# Patient Record
Sex: FEMALE | Race: WHITE | NOT HISPANIC OR LATINO | Employment: FULL TIME | URBAN - METROPOLITAN AREA
[De-identification: names, ages, dates, MRNs, and addresses within clinical notes are randomized per-mention and may not be internally consistent; named-entity substitution may affect disease eponyms.]

---

## 2017-01-12 ENCOUNTER — HOSPITAL ENCOUNTER (OUTPATIENT)
Dept: MAMMOGRAPHY | Facility: HOSPITAL | Age: 57
Discharge: HOME | End: 2017-01-12
Admitting: NURSE PRACTITIONER

## 2017-01-12 DIAGNOSIS — Z12.31 VISIT FOR SCREENING MAMMOGRAM: ICD-10-CM

## 2017-01-12 PROCEDURE — 77067 SCR MAMMO BI INCL CAD: CPT | Performed by: RADIOLOGY

## 2017-01-12 PROCEDURE — G0202 SCR MAMMO BI INCL CAD: HCPCS

## 2017-01-12 PROCEDURE — 77063 BREAST TOMOSYNTHESIS BI: CPT

## 2017-01-12 PROCEDURE — 77063 BREAST TOMOSYNTHESIS BI: CPT | Performed by: RADIOLOGY

## 2017-07-02 ENCOUNTER — HOSPITAL ENCOUNTER (OUTPATIENT)
Facility: HOSPITAL | Age: 57
Setting detail: OBSERVATION
Discharge: HOME | End: 2017-07-03
Attending: EMERGENCY MEDICINE | Admitting: HOSPITALIST

## 2017-07-02 ENCOUNTER — APPOINTMENT (OUTPATIENT)
Dept: CT IMAGING | Facility: HOSPITAL | Age: 57
End: 2017-07-02

## 2017-07-02 DIAGNOSIS — E87.6 HYPOKALEMIA, GASTROINTESTINAL LOSSES: ICD-10-CM

## 2017-07-02 DIAGNOSIS — R19.7 VOMITING AND DIARRHEA: ICD-10-CM

## 2017-07-02 DIAGNOSIS — R11.10 VOMITING AND DIARRHEA: ICD-10-CM

## 2017-07-02 DIAGNOSIS — R10.84 GENERALIZED ABDOMINAL PAIN: Primary | ICD-10-CM

## 2017-07-02 DIAGNOSIS — R11.2 INTRACTABLE VOMITING WITH NAUSEA, UNSPECIFIED VOMITING TYPE: ICD-10-CM

## 2017-07-02 PROBLEM — R94.31 PROLONGED Q-T INTERVAL ON ECG: Status: ACTIVE | Noted: 2017-07-02

## 2017-07-02 LAB
ALBUMIN SERPL-MCNC: 4.7 G/DL (ref 3.2–4.8)
ALBUMIN/GLOB SERPL: 1.7 G/DL (ref 1.5–2.5)
ALP SERPL-CCNC: 60 U/L (ref 25–100)
ALT SERPL W P-5'-P-CCNC: 28 U/L (ref 7–40)
ANION GAP SERPL CALCULATED.3IONS-SCNC: 12 MMOL/L (ref 3–11)
AST SERPL-CCNC: 28 U/L (ref 0–33)
BACTERIA UR QL AUTO: ABNORMAL /HPF
BASOPHILS # BLD AUTO: 0.01 10*3/MM3 (ref 0–0.2)
BASOPHILS NFR BLD AUTO: 0.1 % (ref 0–1)
BILIRUB SERPL-MCNC: 0.9 MG/DL (ref 0.3–1.2)
BILIRUB UR QL STRIP: NEGATIVE
BNP SERPL-MCNC: 11 PG/ML (ref 0–100)
BUN BLD-MCNC: 17 MG/DL (ref 9–23)
BUN/CREAT SERPL: 21.3 (ref 7–25)
CALCIUM SPEC-SCNC: 10.1 MG/DL (ref 8.7–10.4)
CHLORIDE SERPL-SCNC: 104 MMOL/L (ref 99–109)
CK MB SERPL-CCNC: 0.53 NG/ML (ref 0–5)
CK SERPL-CCNC: 58 U/L (ref 26–174)
CLARITY UR: ABNORMAL
CO2 SERPL-SCNC: 22 MMOL/L (ref 20–31)
COLOR UR: YELLOW
CREAT BLD-MCNC: 0.8 MG/DL (ref 0.6–1.3)
DEPRECATED RDW RBC AUTO: 43.2 FL (ref 37–54)
EOSINOPHIL # BLD AUTO: 0.03 10*3/MM3 (ref 0–0.3)
EOSINOPHIL NFR BLD AUTO: 0.3 % (ref 0–3)
ERYTHROCYTE [DISTWIDTH] IN BLOOD BY AUTOMATED COUNT: 13.3 % (ref 11.3–14.5)
GFR SERPL CREATININE-BSD FRML MDRD: 74 ML/MIN/1.73
GLOBULIN UR ELPH-MCNC: 2.8 GM/DL
GLUCOSE BLD-MCNC: 143 MG/DL (ref 70–100)
GLUCOSE UR STRIP-MCNC: NEGATIVE MG/DL
HCT VFR BLD AUTO: 44.5 % (ref 34.5–44)
HGB BLD-MCNC: 15.1 G/DL (ref 11.5–15.5)
HGB UR QL STRIP.AUTO: NEGATIVE
HOLD SPECIMEN: NORMAL
HOLD SPECIMEN: NORMAL
HYALINE CASTS UR QL AUTO: ABNORMAL /LPF
IMM GRANULOCYTES # BLD: 0.03 10*3/MM3 (ref 0–0.03)
IMM GRANULOCYTES NFR BLD: 0.3 % (ref 0–0.6)
KETONES UR QL STRIP: ABNORMAL
LEUKOCYTE ESTERASE UR QL STRIP.AUTO: NEGATIVE
LIPASE SERPL-CCNC: 30 U/L (ref 6–51)
LYMPHOCYTES # BLD AUTO: 0.62 10*3/MM3 (ref 0.6–4.8)
LYMPHOCYTES NFR BLD AUTO: 5.4 % (ref 24–44)
MCH RBC QN AUTO: 30.4 PG (ref 27–31)
MCHC RBC AUTO-ENTMCNC: 33.9 G/DL (ref 32–36)
MCV RBC AUTO: 89.5 FL (ref 80–99)
MONOCYTES # BLD AUTO: 0.4 10*3/MM3 (ref 0–1)
MONOCYTES NFR BLD AUTO: 3.5 % (ref 0–12)
NEUTROPHILS # BLD AUTO: 10.49 10*3/MM3 (ref 1.5–8.3)
NEUTROPHILS NFR BLD AUTO: 90.4 % (ref 41–71)
NITRITE UR QL STRIP: NEGATIVE
PH UR STRIP.AUTO: 8.5 [PH] (ref 5–8)
PLATELET # BLD AUTO: 239 10*3/MM3 (ref 150–450)
PMV BLD AUTO: 10.7 FL (ref 6–12)
POTASSIUM BLD-SCNC: 3.8 MMOL/L (ref 3.5–5.5)
PROT SERPL-MCNC: 7.5 G/DL (ref 5.7–8.2)
PROT UR QL STRIP: ABNORMAL
RBC # BLD AUTO: 4.97 10*6/MM3 (ref 3.89–5.14)
RBC # UR: ABNORMAL /HPF
REF LAB TEST METHOD: ABNORMAL
SODIUM BLD-SCNC: 138 MMOL/L (ref 132–146)
SP GR UR STRIP: 1.01 (ref 1–1.03)
SQUAMOUS #/AREA URNS HPF: ABNORMAL /HPF
TROPONIN I SERPL-MCNC: <0.006 NG/ML
UROBILINOGEN UR QL STRIP: ABNORMAL
WBC NRBC COR # BLD: 11.58 10*3/MM3 (ref 3.5–10.8)
WBC UR QL AUTO: ABNORMAL /HPF
WHOLE BLOOD HOLD SPECIMEN: NORMAL
WHOLE BLOOD HOLD SPECIMEN: NORMAL

## 2017-07-02 PROCEDURE — 96361 HYDRATE IV INFUSION ADD-ON: CPT

## 2017-07-02 PROCEDURE — 85025 COMPLETE CBC W/AUTO DIFF WBC: CPT | Performed by: EMERGENCY MEDICINE

## 2017-07-02 PROCEDURE — 25010000002 ONDANSETRON PER 1 MG: Performed by: EMERGENCY MEDICINE

## 2017-07-02 PROCEDURE — 74177 CT ABD & PELVIS W/CONTRAST: CPT

## 2017-07-02 PROCEDURE — 25010000002 METOCLOPRAMIDE PER 10 MG: Performed by: EMERGENCY MEDICINE

## 2017-07-02 PROCEDURE — 25010000002 ONDANSETRON PER 1 MG

## 2017-07-02 PROCEDURE — 25010000002 DIPHENHYDRAMINE PER 50 MG: Performed by: EMERGENCY MEDICINE

## 2017-07-02 PROCEDURE — 81001 URINALYSIS AUTO W/SCOPE: CPT | Performed by: EMERGENCY MEDICINE

## 2017-07-02 PROCEDURE — 84484 ASSAY OF TROPONIN QUANT: CPT | Performed by: HOSPITALIST

## 2017-07-02 PROCEDURE — 96376 TX/PRO/DX INJ SAME DRUG ADON: CPT

## 2017-07-02 PROCEDURE — 87186 SC STD MICRODIL/AGAR DIL: CPT | Performed by: EMERGENCY MEDICINE

## 2017-07-02 PROCEDURE — 83880 ASSAY OF NATRIURETIC PEPTIDE: CPT | Performed by: HOSPITALIST

## 2017-07-02 PROCEDURE — 87086 URINE CULTURE/COLONY COUNT: CPT | Performed by: EMERGENCY MEDICINE

## 2017-07-02 PROCEDURE — 80053 COMPREHEN METABOLIC PANEL: CPT | Performed by: EMERGENCY MEDICINE

## 2017-07-02 PROCEDURE — 82553 CREATINE MB FRACTION: CPT | Performed by: HOSPITALIST

## 2017-07-02 PROCEDURE — 25010000002 HYDROMORPHONE PER 4 MG: Performed by: EMERGENCY MEDICINE

## 2017-07-02 PROCEDURE — 82550 ASSAY OF CK (CPK): CPT | Performed by: HOSPITALIST

## 2017-07-02 PROCEDURE — 93005 ELECTROCARDIOGRAM TRACING: CPT | Performed by: HOSPITALIST

## 2017-07-02 PROCEDURE — 25010000002 PROMETHAZINE PER 50 MG: Performed by: EMERGENCY MEDICINE

## 2017-07-02 PROCEDURE — 99220 PR INITIAL OBSERVATION CARE/DAY 70 MINUTES: CPT | Performed by: HOSPITALIST

## 2017-07-02 PROCEDURE — 0 IOPAMIDOL 61 % SOLUTION: Performed by: EMERGENCY MEDICINE

## 2017-07-02 PROCEDURE — 87077 CULTURE AEROBIC IDENTIFY: CPT | Performed by: EMERGENCY MEDICINE

## 2017-07-02 PROCEDURE — 96375 TX/PRO/DX INJ NEW DRUG ADDON: CPT

## 2017-07-02 PROCEDURE — 96374 THER/PROPH/DIAG INJ IV PUSH: CPT

## 2017-07-02 PROCEDURE — 99285 EMERGENCY DEPT VISIT HI MDM: CPT

## 2017-07-02 PROCEDURE — 93010 ELECTROCARDIOGRAM REPORT: CPT | Performed by: INTERNAL MEDICINE

## 2017-07-02 PROCEDURE — 83690 ASSAY OF LIPASE: CPT | Performed by: EMERGENCY MEDICINE

## 2017-07-02 RX ORDER — METOCLOPRAMIDE 10 MG/1
10 TABLET ORAL ONCE
Status: DISCONTINUED | OUTPATIENT
Start: 2017-07-02 | End: 2017-07-02

## 2017-07-02 RX ORDER — HYDROMORPHONE HYDROCHLORIDE 1 MG/ML
0.5 INJECTION, SOLUTION INTRAMUSCULAR; INTRAVENOUS; SUBCUTANEOUS
Status: COMPLETED | OUTPATIENT
Start: 2017-07-02 | End: 2017-07-02

## 2017-07-02 RX ORDER — PROMETHAZINE HYDROCHLORIDE 25 MG/ML
12.5 INJECTION, SOLUTION INTRAMUSCULAR; INTRAVENOUS ONCE
Status: COMPLETED | OUTPATIENT
Start: 2017-07-02 | End: 2017-07-02

## 2017-07-02 RX ORDER — ACETAMINOPHEN 500 MG
500 TABLET ORAL EVERY 6 HOURS PRN
Status: DISCONTINUED | OUTPATIENT
Start: 2017-07-02 | End: 2017-07-03 | Stop reason: HOSPADM

## 2017-07-02 RX ORDER — SODIUM CHLORIDE 0.9 % (FLUSH) 0.9 %
10 SYRINGE (ML) INJECTION AS NEEDED
Status: DISCONTINUED | OUTPATIENT
Start: 2017-07-02 | End: 2017-07-03 | Stop reason: HOSPADM

## 2017-07-02 RX ORDER — ONDANSETRON 2 MG/ML
4 INJECTION INTRAMUSCULAR; INTRAVENOUS ONCE
Status: COMPLETED | OUTPATIENT
Start: 2017-07-02 | End: 2017-07-02

## 2017-07-02 RX ORDER — BUPROPION HYDROCHLORIDE 300 MG/1
300 TABLET ORAL DAILY
Status: ON HOLD | COMMUNITY
End: 2017-07-02

## 2017-07-02 RX ORDER — DEXTROSE AND SODIUM CHLORIDE 5; .45 G/100ML; G/100ML
125 INJECTION, SOLUTION INTRAVENOUS CONTINUOUS
Status: DISCONTINUED | OUTPATIENT
Start: 2017-07-02 | End: 2017-07-03 | Stop reason: HOSPADM

## 2017-07-02 RX ORDER — METOCLOPRAMIDE 10 MG/1
10 TABLET ORAL
Qty: 6 TABLET | Refills: 0 | Status: SHIPPED | OUTPATIENT
Start: 2017-07-02 | End: 2017-07-02

## 2017-07-02 RX ORDER — DIPHENHYDRAMINE HYDROCHLORIDE 50 MG/ML
25 INJECTION INTRAMUSCULAR; INTRAVENOUS ONCE
Status: COMPLETED | OUTPATIENT
Start: 2017-07-02 | End: 2017-07-02

## 2017-07-02 RX ORDER — ESTRADIOL 0.07 MG/D
1 PATCH TRANSDERMAL 2 TIMES WEEKLY
COMMUNITY

## 2017-07-02 RX ORDER — ESTRADIOL 0.1 MG/G
2 CREAM VAGINAL 2 TIMES WEEKLY
COMMUNITY

## 2017-07-02 RX ORDER — ONDANSETRON 4 MG/1
4 TABLET, FILM COATED ORAL EVERY 6 HOURS PRN
Qty: 8 TABLET | Refills: 0 | Status: SHIPPED | OUTPATIENT
Start: 2017-07-02 | End: 2017-07-02

## 2017-07-02 RX ORDER — METOCLOPRAMIDE HYDROCHLORIDE 5 MG/ML
10 INJECTION INTRAMUSCULAR; INTRAVENOUS ONCE
Status: COMPLETED | OUTPATIENT
Start: 2017-07-02 | End: 2017-07-02

## 2017-07-02 RX ADMIN — PROMETHAZINE HYDROCHLORIDE 12.5 MG: 25 INJECTION INTRAMUSCULAR; INTRAVENOUS at 07:11

## 2017-07-02 RX ADMIN — HYDROMORPHONE HYDROCHLORIDE 0.5 MG: 1 INJECTION, SOLUTION INTRAMUSCULAR; INTRAVENOUS; SUBCUTANEOUS at 06:31

## 2017-07-02 RX ADMIN — IOPAMIDOL 85 ML: 612 INJECTION, SOLUTION INTRAVENOUS at 07:40

## 2017-07-02 RX ADMIN — SODIUM CHLORIDE 1000 ML: 9 INJECTION, SOLUTION INTRAVENOUS at 09:00

## 2017-07-02 RX ADMIN — SODIUM CHLORIDE 1000 ML: 9 INJECTION, SOLUTION INTRAVENOUS at 06:04

## 2017-07-02 RX ADMIN — DEXTROSE AND SODIUM CHLORIDE 125 ML/HR: 5; 450 INJECTION, SOLUTION INTRAVENOUS at 12:36

## 2017-07-02 RX ADMIN — PROMETHAZINE HYDROCHLORIDE 12.5 MG: 25 INJECTION INTRAMUSCULAR; INTRAVENOUS at 06:04

## 2017-07-02 RX ADMIN — DIPHENHYDRAMINE HYDROCHLORIDE 25 MG: 50 INJECTION INTRAMUSCULAR; INTRAVENOUS at 09:05

## 2017-07-02 RX ADMIN — HYDROMORPHONE HYDROCHLORIDE 0.5 MG: 1 INJECTION, SOLUTION INTRAMUSCULAR; INTRAVENOUS; SUBCUTANEOUS at 06:06

## 2017-07-02 RX ADMIN — ONDANSETRON 4 MG: 2 INJECTION INTRAMUSCULAR; INTRAVENOUS at 05:33

## 2017-07-02 RX ADMIN — METOCLOPRAMIDE 10 MG: 5 INJECTION, SOLUTION INTRAMUSCULAR; INTRAVENOUS at 09:16

## 2017-07-02 RX ADMIN — DEXTROSE AND SODIUM CHLORIDE 125 ML/HR: 5; 450 INJECTION, SOLUTION INTRAVENOUS at 21:15

## 2017-07-02 RX ADMIN — ONDANSETRON 4 MG: 2 INJECTION INTRAMUSCULAR; INTRAVENOUS at 06:31

## 2017-07-02 NOTE — ED PROVIDER NOTES
Subjective   HPI Comments: Patient states that she began feeling mildly nauseous yesterday afternoon with a decreased appetite.  Last night around approximately 11:00 she began to experience diarrhea and vomiting which have persisted throughout the night with associated abdominal pain.  She believes she stopped approximately 8 times overnight.  She denies similar previous episodes.    Patient is a 57 y.o. female presenting with abdominal pain.   History provided by:  Patient  Abdominal Pain   Pain location:  Epigastric  Pain quality: cramping    Pain radiates to:  Does not radiate  Onset quality:  Gradual  Duration:  1 day  Timing:  Constant  Progression:  Worsening  Chronicity:  New  Context: eating, retching and suspicious food intake    Context: not alcohol use, not awakening from sleep, not diet changes, not laxative use, not medication withdrawal, not previous surgeries, not recent illness, not recent travel and not trauma    Relieved by:  Nothing  Worsened by:  Nothing  Ineffective treatments:  None tried  Associated symptoms: anorexia, diarrhea, fatigue, fever, flatus, hematemesis, hematochezia, hematuria, nausea and vomiting    Associated symptoms: no belching, no chest pain, no chills, no constipation, no cough, no dysuria, no melena, no shortness of breath, no sore throat, no vaginal bleeding and no vaginal discharge    Risk factors: no alcohol abuse, no aspirin use, not elderly, has not had multiple surgeries, no NSAID use, not obese, not pregnant and no recent hospitalization        Review of Systems   Constitutional: Positive for fatigue and fever. Negative for chills.   HENT: Negative for sore throat.    Respiratory: Negative for cough and shortness of breath.    Cardiovascular: Negative for chest pain.   Gastrointestinal: Positive for abdominal pain, anorexia, diarrhea, flatus, hematemesis, hematochezia, nausea and vomiting. Negative for constipation and melena.   Genitourinary: Positive for  hematuria. Negative for dysuria, vaginal bleeding and vaginal discharge.   All other systems reviewed and are negative.      History reviewed. No pertinent past medical history.    No Known Allergies    Past Surgical History:   Procedure Laterality Date   •  SECTION     • CHOLECYSTECTOMY         History reviewed. No pertinent family history.    Social History     Social History   • Marital status:      Spouse name: N/A   • Number of children: N/A   • Years of education: N/A     Social History Main Topics   • Smoking status: Never Smoker   • Smokeless tobacco: None   • Alcohol use No   • Drug use: No   • Sexual activity: Not Asked     Other Topics Concern   • None     Social History Narrative   • None           Objective   Physical Exam   Constitutional: She is oriented to person, place, and time. She appears well-developed and well-nourished. She appears distressed.   Pt is hyperventilating and has just finished vomiting upon my entry to the room.   HENT:   Head: Normocephalic and atraumatic.   Eyes: Conjunctivae and EOM are normal. Pupils are equal, round, and reactive to light.   Neck: Normal range of motion. Neck supple.   Cardiovascular: Normal rate, regular rhythm and normal heart sounds.    Pulmonary/Chest: Effort normal and breath sounds normal. No respiratory distress.   Abdominal: Soft. Bowel sounds are normal. She exhibits no distension and no mass. There is tenderness. There is no rebound.   epigastric   Musculoskeletal: Normal range of motion.   Neurological: She is alert and oriented to person, place, and time.   Skin: Skin is warm and dry.   Psychiatric: She has a normal mood and affect.   Nursing note and vitals reviewed.      Procedures         ED Course  ED Course        Recent Results (from the past 24 hour(s))   Comprehensive Metabolic Panel    Collection Time: 17  5:35 AM   Result Value Ref Range    Glucose 143 (H) 70 - 100 mg/dL    BUN 17 9 - 23 mg/dL    Creatinine 0.80  0.60 - 1.30 mg/dL    Sodium 138 132 - 146 mmol/L    Potassium 3.8 3.5 - 5.5 mmol/L    Chloride 104 99 - 109 mmol/L    CO2 22.0 20.0 - 31.0 mmol/L    Calcium 10.1 8.7 - 10.4 mg/dL    Total Protein 7.5 5.7 - 8.2 g/dL    Albumin 4.70 3.20 - 4.80 g/dL    ALT (SGPT) 28 7 - 40 U/L    AST (SGOT) 28 0 - 33 U/L    Alkaline Phosphatase 60 25 - 100 U/L    Total Bilirubin 0.9 0.3 - 1.2 mg/dL    eGFR Non African Amer 74 >60 mL/min/1.73    Globulin 2.8 gm/dL    A/G Ratio 1.7 1.5 - 2.5 g/dL    BUN/Creatinine Ratio 21.3 7.0 - 25.0    Anion Gap 12.0 (H) 3.0 - 11.0 mmol/L   Lipase    Collection Time: 07/02/17  5:35 AM   Result Value Ref Range    Lipase 30 6 - 51 U/L   Light Blue Top    Collection Time: 07/02/17  5:35 AM   Result Value Ref Range    Extra Tube hold for add-on    Green Top (Gel)    Collection Time: 07/02/17  5:35 AM   Result Value Ref Range    Extra Tube Hold for add-ons.    Lavender Top    Collection Time: 07/02/17  5:35 AM   Result Value Ref Range    Extra Tube hold for add-on    Gold Top - SST    Collection Time: 07/02/17  5:35 AM   Result Value Ref Range    Extra Tube Hold for add-ons.    CBC Auto Differential    Collection Time: 07/02/17  5:35 AM   Result Value Ref Range    WBC 11.58 (H) 3.50 - 10.80 10*3/mm3    RBC 4.97 3.89 - 5.14 10*6/mm3    Hemoglobin 15.1 11.5 - 15.5 g/dL    Hematocrit 44.5 (H) 34.5 - 44.0 %    MCV 89.5 80.0 - 99.0 fL    MCH 30.4 27.0 - 31.0 pg    MCHC 33.9 32.0 - 36.0 g/dL    RDW 13.3 11.3 - 14.5 %    RDW-SD 43.2 37.0 - 54.0 fl    MPV 10.7 6.0 - 12.0 fL    Platelets 239 150 - 450 10*3/mm3    Neutrophil % 90.4 (H) 41.0 - 71.0 %    Lymphocyte % 5.4 (L) 24.0 - 44.0 %    Monocyte % 3.5 0.0 - 12.0 %    Eosinophil % 0.3 0.0 - 3.0 %    Basophil % 0.1 0.0 - 1.0 %    Immature Grans % 0.3 0.0 - 0.6 %    Neutrophils, Absolute 10.49 (H) 1.50 - 8.30 10*3/mm3    Lymphocytes, Absolute 0.62 0.60 - 4.80 10*3/mm3    Monocytes, Absolute 0.40 0.00 - 1.00 10*3/mm3    Eosinophils, Absolute 0.03 0.00 - 0.30  10*3/mm3    Basophils, Absolute 0.01 0.00 - 0.20 10*3/mm3    Immature Grans, Absolute 0.03 0.00 - 0.03 10*3/mm3   Urinalysis With / Culture If Indicated    Collection Time: 07/02/17  6:43 AM   Result Value Ref Range    Color, UA Yellow Yellow, Straw    Appearance, UA Slightly Cloudy (A) Clear    pH, UA 8.5 (H) 5.0 - 8.0    Specific Gravity, UA 1.010 1.005 - 1.030    Glucose, UA Negative Negative    Ketones, UA 15 mg/dL (1+) (A) Negative    Bilirubin, UA Negative Negative    Blood, UA Negative Negative    Protein,  mg/dL (2+) (A) Negative    Leuk Esterase, UA Negative Negative    Nitrite, UA Negative Negative    Urobilinogen, UA 1.0 E.U./dL 0.2 - 1.0 E.U./dL   Urinalysis, Microscopic Only    Collection Time: 07/02/17  6:43 AM   Result Value Ref Range    RBC, UA 0-2 None Seen, 0-2 /HPF    WBC, UA 3-5 (A) None Seen /HPF    Bacteria, UA 3+ (A) None Seen, Trace /HPF    Squamous Epithelial Cells, UA 3-6 (A) None Seen, 0-2 /HPF    Hyaline Casts, UA None Seen 0 - 6 /LPF    Methodology Automated Microscopy      Note: In addition to lab results from this visit, the labs listed above may include labs taken at another facility or during a different encounter within the last 24 hours. Please correlate lab times with ED admission and discharge times for further clarification of the services performed during this visit.    CT Abdomen Pelvis With Contrast   Final Result   Abnormal     No acute abdominal inflammation or bowel obstruction. Possible nonspecific    jejunitis or localized ileus.      THIS DOCUMENT HAS BEEN ELECTRONICALLY SIGNED BY LULA MARINELLI MD        Vitals:    07/02/17 0629 07/02/17 0630 07/02/17 0645 07/02/17 0808   BP:  131/66  101/47   BP Location:    Left arm   Patient Position:    Lying   Pulse: 71  66 88   Resp:       Temp:       TempSrc:       SpO2: 100%  100% 94%   Weight:       Height:         Medications   sodium chloride 0.9 % flush 10 mL (not administered)   ondansetron (ZOFRAN) injection 4 mg (4 mg  Intravenous Given 7/2/17 0533)   sodium chloride 0.9 % bolus 1,000 mL (0 mL Intravenous Stopped 7/2/17 0751)   promethazine (PHENERGAN) injection 12.5 mg (12.5 mg Intravenous Given 7/2/17 0604)   HYDROmorphone (DILAUDID) injection 0.5 mg (0.5 mg Intravenous Given 7/2/17 0631)   ondansetron (ZOFRAN) injection 4 mg (4 mg Intravenous Given 7/2/17 0631)   promethazine (PHENERGAN) injection 12.5 mg (12.5 mg Intravenous Given 7/2/17 0711)   iopamidol (ISOVUE-300) 61 % injection 100 mL (85 mL Intravenous Given 7/2/17 0740)     ECG/EMG Results (last 24 hours)     ** No results found for the last 24 hours. **        Patient is feeling much better following hydration and medication in the emergency department.  She understands result of discomfort with attempted outpatient treatment.  She will drink primarily fluid diet for the next 2 days and will return to the emergency department if symptoms worsen or other concerns arise.           MDM    Final diagnoses:   Generalized abdominal pain   Vomiting and diarrhea            Ronal Ramirez DO  07/02/17 [id number]

## 2017-07-02 NOTE — H&P
"Hospital Medicine History and Physical    Primary Care Physician: LUL Rojo    Chief complaint:  Intractable vomiting    History of Present Illness    57 year old female who presented with acute onset vomiting.  She presented to the ER with intractable vomiting / nausea and abdominal pain.  She says things started with nausea at 3-4pm last night and by 11pm she started vomiting and having diarrhea.  She was seen in the ER and planned for discharge this morning, but persisted in her symptoms even with mediations and treatment she was struggling to improve enough to go home.  She was planned for discharge, but was admitted due to persistent symptoms.  She is taking a new medication called Contrave which is a combination medication for obesity.    Review of Systems   Otherwise complete ROS is negative except as mentioned in the HPI.    Past Medical History:     Hypothyroidism  Menopause since   Gallbladder Surgery  Obesity    Past Surgical History:  Past Surgical History:   Procedure Laterality Date   •  SECTION     • CHOLECYSTECTOMY         Family History: family history is not on file.   Father - MI; CHF  Mother - Atrial Fibrillation; CHF    Social History:  reports that she has never smoked. She does not have any smokeless tobacco history on file. She reports that she does not drink alcohol or use illicit drugs.    2 kids  Masters Degree - Social work  Denies smoking, alcohol, drug use    Medications:  Prescriptions Prior to Admission   Medication Sig Dispense Refill Last Dose   • naltrexone-bupropion ER (CONTRAVE) 8-90 MG tablet Take 2 tablets by mouth 2 (Two) Times a Day.      • Unable to find 1 each 1 (One) Time. Estrogen Patch.  PT unaware of dose or name        No Known Allergies    Objective    Physical Exam:   Vital Signs: /62 (BP Location: Left arm, Patient Position: Lying)  Pulse 81  Temp 96.7 °F (35.9 °C) (Axillary)   Resp 22  Ht 63\" (160 cm)  Wt 196 lb " (88.9 kg)  SpO2 93%  BMI 34.72 kg/m2  Physical Exam  Temp:  [96.7 °F (35.9 °C)] 96.7 °F (35.9 °C)  Heart Rate:  [66-88] 81  Resp:  [22] 22  BP: (101-137)/(47-69) 123/62  Constitutional: no acute distress, awake, alert  Eyes: PERRLA, sclerae anicteric, no conjunctival injection  Neck: supple, no thyromegaly, trachea midline  Respiratory: Clear to auscultation bilaterally, nonlabored respirations   Cardiovascular: RRR, no murmurs, rubs, or gallops, palpable pedal pulses bilaterally  Gastrointestinal: Positive bowel sounds, soft, nontender, nondistended  Musculoskeletal: No bilateral ankle edema, no clubbing or cyanosis to bilateral lower extremities  Psychiatric: oriented x 3, appropriate affect, cooperative  Neurologic: Strength symmetric in all extremities, Cranial Nerves grossly intact to confrontation       Results Reviewed:  I have personally reviewed current lab, radiology, and data and agree.    Results from last 7 days  Lab Units 07/02/17  0535   WBC 10*3/mm3 11.58*   HEMOGLOBIN g/dL 15.1   PLATELETS 10*3/mm3 239       Results from last 7 days  Lab Units 07/02/17  0535   SODIUM mmol/L 138   POTASSIUM mmol/L 3.8   CO2 mmol/L 22.0   CREATININE mg/dL 0.80   GLUCOSE mg/dL 143*   CALCIUM mg/dL 10.1           Assessment/Plan   Assessment & Plan  Active Problems:    * No active hospital problems. *    57 year old with intractable vomiting which started yesterday.    Plan:  Intractable Vomiting  - IV antiemetics PRN  - common reaction to Contrave is nausea / vomiting  - dextrose fluids for now until able to take enough po to support herself  Ketonuria  - ketones in urine / poor oral intake  - starvation ketosis currently  - start D5 1/2 ns  Obesity  - on Contrave (naltrexone / bupropion) for obesity  - hold Contrave for now in light of nausea / vomiting  Menopause  - takes estrogen patch  - also oral progestin for symptoms of menopause  Holistic Medical Treatments  - on multiple vitamins / supplements at home  -  tumeric, ginko biloba, vitamin B12, vitamin pills, etc    I discussed the patients findings and my recommendations with patient in ER    Dash Bowden MD 07/02/17 12:08 PM

## 2017-07-02 NOTE — PLAN OF CARE
Problem: Fluid Volume Deficit (Adult)  Goal: Identify Related Risk Factors and Signs and Symptoms  Outcome: Ongoing (interventions implemented as appropriate)    07/02/17 1340   Fluid Volume Deficit   Fluid Volume Deficit: Related Risk Factors Npo, nausea/vomiting   Signs and Symptoms (Fluid Volume Deficit) mucous membranes dry;nausea/vomiting, anorexia,  complaints

## 2017-07-03 VITALS
TEMPERATURE: 97.9 F | HEART RATE: 77 BPM | WEIGHT: 200 LBS | OXYGEN SATURATION: 97 % | HEIGHT: 63 IN | SYSTOLIC BLOOD PRESSURE: 97 MMHG | BODY MASS INDEX: 35.44 KG/M2 | DIASTOLIC BLOOD PRESSURE: 51 MMHG | RESPIRATION RATE: 18 BRPM

## 2017-07-03 PROBLEM — R11.10 VOMITING: Status: ACTIVE | Noted: 2017-07-03

## 2017-07-03 PROBLEM — E87.6 HYPOKALEMIA, GASTROINTESTINAL LOSSES: Status: ACTIVE | Noted: 2017-07-03

## 2017-07-03 LAB
ANION GAP SERPL CALCULATED.3IONS-SCNC: 9 MMOL/L (ref 3–11)
BUN BLD-MCNC: 7 MG/DL (ref 9–23)
BUN/CREAT SERPL: 11.7 (ref 7–25)
CALCIUM SPEC-SCNC: 8.3 MG/DL (ref 8.7–10.4)
CHLORIDE SERPL-SCNC: 109 MMOL/L (ref 99–109)
CO2 SERPL-SCNC: 25 MMOL/L (ref 20–31)
CREAT BLD-MCNC: 0.6 MG/DL (ref 0.6–1.3)
DEPRECATED RDW RBC AUTO: 48 FL (ref 37–54)
ERYTHROCYTE [DISTWIDTH] IN BLOOD BY AUTOMATED COUNT: 13.9 % (ref 11.3–14.5)
GFR SERPL CREATININE-BSD FRML MDRD: 103 ML/MIN/1.73
GLUCOSE BLD-MCNC: 91 MG/DL (ref 70–100)
HCT VFR BLD AUTO: 37.5 % (ref 34.5–44)
HGB BLD-MCNC: 12.1 G/DL (ref 11.5–15.5)
MAGNESIUM SERPL-MCNC: 2 MG/DL (ref 1.3–2.7)
MCH RBC QN AUTO: 30.4 PG (ref 27–31)
MCHC RBC AUTO-ENTMCNC: 32.3 G/DL (ref 32–36)
MCV RBC AUTO: 94.2 FL (ref 80–99)
PHOSPHATE SERPL-MCNC: 1.8 MG/DL (ref 2.4–5.1)
PLATELET # BLD AUTO: 160 10*3/MM3 (ref 150–450)
PMV BLD AUTO: 10.6 FL (ref 6–12)
POTASSIUM BLD-SCNC: 3 MMOL/L (ref 3.5–5.5)
RBC # BLD AUTO: 3.98 10*6/MM3 (ref 3.89–5.14)
SODIUM BLD-SCNC: 143 MMOL/L (ref 132–146)
TSH SERPL DL<=0.05 MIU/L-ACNC: 0.99 MIU/ML (ref 0.35–5.35)
WBC NRBC COR # BLD: 5.69 10*3/MM3 (ref 3.5–10.8)

## 2017-07-03 PROCEDURE — 80048 BASIC METABOLIC PNL TOTAL CA: CPT | Performed by: HOSPITALIST

## 2017-07-03 PROCEDURE — G0378 HOSPITAL OBSERVATION PER HR: HCPCS

## 2017-07-03 PROCEDURE — 99217 PR OBSERVATION CARE DISCHARGE MANAGEMENT: CPT | Performed by: NURSE PRACTITIONER

## 2017-07-03 PROCEDURE — 96361 HYDRATE IV INFUSION ADD-ON: CPT

## 2017-07-03 PROCEDURE — 84100 ASSAY OF PHOSPHORUS: CPT | Performed by: HOSPITALIST

## 2017-07-03 PROCEDURE — 83735 ASSAY OF MAGNESIUM: CPT | Performed by: HOSPITALIST

## 2017-07-03 PROCEDURE — 85027 COMPLETE CBC AUTOMATED: CPT | Performed by: HOSPITALIST

## 2017-07-03 PROCEDURE — 84443 ASSAY THYROID STIM HORMONE: CPT | Performed by: HOSPITALIST

## 2017-07-03 RX ORDER — POTASSIUM CHLORIDE 1.5 G/1.77G
40 POWDER, FOR SOLUTION ORAL AS NEEDED
Status: DISCONTINUED | OUTPATIENT
Start: 2017-07-03 | End: 2017-07-03 | Stop reason: HOSPADM

## 2017-07-03 RX ORDER — POTASSIUM CHLORIDE 750 MG/1
40 CAPSULE, EXTENDED RELEASE ORAL AS NEEDED
Status: DISCONTINUED | OUTPATIENT
Start: 2017-07-03 | End: 2017-07-03 | Stop reason: HOSPADM

## 2017-07-03 RX ORDER — POTASSIUM CHLORIDE 750 MG/1
20 TABLET, FILM COATED, EXTENDED RELEASE ORAL 2 TIMES DAILY
Qty: 2 TABLET | Refills: 0 | Status: SHIPPED | OUTPATIENT
Start: 2017-07-03 | End: 2017-07-04

## 2017-07-03 RX ADMIN — POTASSIUM & SODIUM PHOSPHATES POWDER PACK 280-160-250 MG 2 PACKET: 280-160-250 PACK at 10:23

## 2017-07-03 RX ADMIN — DEXTROSE AND SODIUM CHLORIDE 125 ML/HR: 5; 450 INJECTION, SOLUTION INTRAVENOUS at 04:21

## 2017-07-03 RX ADMIN — POTASSIUM CHLORIDE 40 MEQ: 750 CAPSULE, EXTENDED RELEASE ORAL at 09:29

## 2017-07-03 NOTE — DISCHARGE INSTR - ACTIVITY
Take potassium today at home at 1 pm and at pm, recheck lab tomorrow at any [facility] lab. Follow up with your primary provider.     The next couple of days stick to a bland diet.

## 2017-07-03 NOTE — DISCHARGE SUMMARY
[facility] Medicine Services  DISCHARGE SUMMARY       Date of Admission: 7/2/2017  Date of Discharge:  7/3/2017  Primary Care Physician: LUL Rojo  Consulting Physician(s)          None           Discharge Diagnoses:  Active Hospital Problems (** Indicates Principal Problem)    Diagnosis Date Noted   • Vomiting [R11.10] 07/03/2017   • Hypokalemia, gastrointestinal losses [E87.6] 07/03/2017   • Prolonged Q-T interval on ECG [R94.31] 07/02/2017      Resolved Hospital Problems    Diagnosis Date Noted Date Resolved   No resolved problems to display.       Presenting Problem/History of Present Illness  Vomiting [R11.10]     Chief Complaint on Day of Discharge: Vomiting    History of Present Illness on Day of Discharge: Patient in bed. No further N/V. Denies diarrhea, constipation, fevers, chills, pain, or dyspnea. Tolerating po fluids well. Reports ready to go home.     Hospital Course  Patient is a 57 y.o. female presented with a history of hypothyroidism and obesity, who presented with acute onset vomiting. She presented to the ER with intractable vomiting / nausea and abdominal pain. She says things started with nausea at 3-4pm last night, 7/2/17 and by 11pm she started vomiting and having diarrhea. She was seen in the ER and planned for discharge, but persisted in her symptoms even with mediations and treatment she was struggling to improve enough to go home. She was planned for discharge, but was admitted due to persistent symptoms. She is taking Contrave which is a combination medication for obesity, has been taking for approximately 3 weeks. She was admitted by the hospital medicine service. After admission, her potassium level was noted decreased and she was given supplement for this. By AM 7/3/17, she had markedly improved and was without further N/V, diarrhea and was discharged home as it was felt she had received maximal benefit from this hospital visit.        Procedures Performed         Consults:   Consults     No orders found for last 30 day(s).          Pertinent Test Results:  CBC (No Diff) [[id number]] (Normal) Collected: 07/03/17 0533        Lab Status: Final result Specimen: Blood Updated: 07/03/17 0818        WBC 5.69 10*3/mm3         RBC 3.98 10*6/mm3         Hemoglobin 12.1 g/dL         Hematocrit 37.5 %         MCV 94.2 fL         MCH 30.4 pg         MCHC 32.3 g/dL         RDW 13.9 %         RDW-SD 48.0 fl         MPV 10.6 fL         Platelets 160 10*3/mm3        Narrative:         Verified by repeat analysis.       Basic Metabolic Panel [[id number]] (Abnormal) Collected: 07/03/17 0533       Lab Status: Final result Specimen: Blood Updated: 07/03/17 0724        Glucose 91 mg/dL         BUN 7 (L) mg/dL         Creatinine 0.60 mg/dL         Sodium 143 mmol/L         Potassium 3.0 (L) mmol/L         Chloride 109 mmol/L         CO2 25.0 mmol/L         Calcium 8.3 (L) mg/dL         eGFR Non African Amer 103 mL/min/1.73         BUN/Creatinine Ratio 11.7        Anion Gap 9.0 mmol/L         Magnesium [[id number]] (Normal) Collected: 07/03/17 0533       Lab Status: Final result Specimen: Blood Updated: 07/03/17 0719        Magnesium 2.0 mg/dL        Phosphorus [[id number]] (Abnormal) Collected: 07/03/17 0533       Lab Status: Final result Specimen: Blood Updated: 07/03/17 0719        Phosphorus 1.8 (L) mg/dL        TSH [[id number]] (Normal) Collected: 07/03/17 0533       Lab Status: Final result Specimen: Blood Updated: 07/03/17 0719        TSH 0.994 mIU/mL        Urinalysis With / Culture If Indicated [[id number]] (Abnormal) Collected: 07/02/17 0643       Lab Status: Final result Specimen: Urine from Urine, Clean Catch Updated: 07/02/17 0728        Color, UA Yellow        Appearance, UA Slightly Cloudy (A)        pH, UA 8.5 (H)        Specific [place], UA 1.010        Glucose, UA Negative        Ketones, UA 15 mg/dL (1+) (A)        Bilirubin, UA Negative        Blood,  UA Negative        Protein,  mg/dL (2+) (A)        Leuk Esterase, UA Negative        Nitrite, UA Negative        Urobilinogen, UA 1.0 E.U./dL       Urinalysis, Microscopic Only [[id number]] (Abnormal) Collected: 07/02/17 0643       Lab Status: Final result Specimen: Urine from Urine, Clean Catch Updated: 07/02/17 0728        RBC, UA 0-2 /HPF         WBC, UA 3-5 (A) /HPF         Bacteria, UA 3+ (A) /HPF         Squamous Epithelial Cells, UA 3-6 (A) /HPF         Hyaline Casts, UA None Seen /LPF         Methodology Automated Microscopy       Comprehensive Metabolic Panel [[id number]] (Abnormal) Collected: 07/02/17 0535       Lab Status: Final result Specimen: Blood Updated: 07/02/17 0606        Glucose 143 (H) mg/dL         BUN 17 mg/dL         Creatinine 0.80 mg/dL         Sodium 138 mmol/L         Potassium 3.8 mmol/L         Chloride 104 mmol/L         CO2 22.0 mmol/L         Calcium 10.1 mg/dL         Total Protein 7.5 g/dL         Albumin 4.70 g/dL         ALT (SGPT) 28 U/L         AST (SGOT) 28 U/L         Alkaline Phosphatase 60 U/L         Total Bilirubin 0.9 mg/dL         eGFR Non African Amer 74 mL/min/1.73         Globulin 2.8 gm/dL         A/G Ratio 1.7 g/dL         BUN/Creatinine Ratio 21.3        Anion Gap 12.0 (H) mmol/L       Troponin [[id number]] (Normal) Collected: 07/02/17 0535        Lab Status: Final result Specimen: Blood Updated: 07/02/17 1314        Troponin I <0.006 ng/mL        Narrative:         Ultra Troponin I Reference Range:    <=0.039 ng/mL: Negative  0.04-0.779 ng/mL: Indeterminate Range. Clinical correlation required.  >=0.78  ng/mL: Consistent with myocardial injury. Clinical correlation required.       CK Total & CKMB [[id number]] (Normal) Collected: 07/02/17 0535       Lab Status: Final result Specimen: Blood Updated: 07/02/17 1314        CKMB 0.53 ng/mL         Creatine Kinase 58 U/L        BNP [[id number]] (Normal) Collected: 07/02/17 0535       Lab Status: Final result Specimen:  "Blood Updated: 07/02/17 1255        BNP 11.0 pg/mL         Condition on Discharge:  Stable    Physical Exam on Discharge:BP 97/51 (BP Location: Right arm, Patient Position: Sitting)  Pulse 77  Temp 97.9 °F (36.6 °C) (Oral)   Resp 18  Ht 63\" (160 cm)  Wt 200 lb (90.7 kg)  SpO2 97%  BMI 35.43 kg/m2  Physical Exam   Constitutional: She is oriented to person, place, and time. She appears well-developed and well-nourished.   HENT:   Head: Normocephalic and atraumatic.   Eyes: Conjunctivae are normal.   Neck: Neck supple. No JVD present.   No bruits noted   Cardiovascular: Normal rate, regular rhythm and normal heart sounds.    Pulmonary/Chest: Effort normal and breath sounds normal.   Abdominal: Soft. Bowel sounds are normal.   Musculoskeletal: She exhibits no edema.   Neurological: She is alert and oriented to person, place, and time.   Skin: Skin is warm and dry.   Psychiatric: She has a normal mood and affect. Her behavior is normal.   Vitals reviewed.        Discharge Disposition  Home or Self Care    Discharge Medications   Billie Foster   Home Medication Instructions DOMINIC:[id number]    Printed on:07/03/17 0926   Medication Information                      estradiol (CLIMARA) 0.075 MG/24HR patch  Place 1 patch on the skin 2 (Two) Times a Week.             estradiol (ESTRACE) 0.1 MG/GM vaginal cream  Insert 2 g into the vagina 2 (Two) Times a Week.             potassium chloride (K-DUR) 10 MEQ CR tablet  Take 2 tablets by mouth 2 (Two) Times a Day for 1 day.             progesterone (PROMETRIUM) 100 MG capsule  Take 100 mg by mouth Every Night.             Thyroid (NATURE-THROID) 16.25 MG PO tablet  Take 16.25 mg by mouth 2 (Two) Times a Day. Take with 32.5 mg tablet             Thyroid (NATURE-THROID) 32.5 MG PO tablet  Take 32.5 mg by mouth 2 (Two) Times a Day. Take with 16.25 mg tablet                 Discharge Diet:   Diet Instructions     Diet: Regular; Thin Liquids, No Restrictions       Discharge " Diet:  Regular   Fluid Consistency:  Thin Liquids, No Restrictions                 Discharge Care Plan / Instructions:    Activity at Discharge:   Activity Instructions     Activity as Tolerated                     Follow-up Appointments  No future appointments.  Additional Instructions for the Follow-ups that You Need to Schedule     Discharge Follow-up with PCP    As directed    Follow Up Details:  1 wk   Has the patient’s follow-up appointment been scheduled and documented in the discharge navigator?:  Yes, documented in the appointment section       Basic Metabolic Panel    Jul 08, 2017 (Approximate)    Tomorrow as outpatient-order hand written for this to go to Primary Provider                 Test Results Pending at Discharge   Order Current Status    Urine Culture In process           Blayne Garcias, APRN 07/03/17 9:26 AM    Time: Discharge 35 min    Please note that portions of this note may have been completed with a voice recognition program. Efforts were made to edit the dictations, but occasionally words are mistranscribed.

## 2017-07-03 NOTE — PROGRESS NOTES
Discharge Planning Assessment  [facility]     Patient Name: Billie Foster  MRN: [id number]  Today's Date: 7/3/2017    Admit Date: 7/2/2017          Discharge Needs Assessment       07/03/17 1036    Living Environment    Lives With alone    Living Arrangements house    Home Accessibility stairs to enter home    Number of Stairs to Enter Home 3    Type of Financial/Environmental Concern none    Transportation Available car;family or friend will provide    Living Environment    Provides Primary Care For no one    Quality Of Family Relationships unable to assess    Able to Return to Prior Living Arrangements yes    Discharge Needs Assessment    Concerns To Be Addressed no discharge needs identified;denies needs/concerns at this time    Readmission Within The Last 30 Days no previous admission in last 30 days    Anticipated Changes Related to Illness none    Equipment Currently Used at Home none    Equipment Needed After Discharge none    Discharge Disposition home or self-care    Discharge Contact Information if Applicable Dede Gonzalez,  sister  [phone]            Discharge Plan       07/03/17 1037    Case Management/Social Work Plan    Plan Home    Patient/Family In Agreement With Plan yes    Additional Comments Spoke with patient at bedside regarding discharge planning.  Patient denies use of Home Health or DME.  Denies difficulty affording medications.  Patient lives alone in a single level home wiht 3 steps to access.  Denies home safety concerns.  Patient plans to discharge today via car with friend to transport.          Discharge Placement     No information found        Expected Discharge Date and Time     Expected Discharge Date Expected Discharge Time    Jul 3, 2017               Demographic Summary       07/03/17 1035    Referral Information    Admission Type outpatient in a bed    Arrived From home or self-care    Referral Source admission list    Reason For Consult discharge planning    Record  Reviewed clinical discipline documentation;history and physical;patient profile    Primary Care Physician Information    Name Aileen GrKENA            Functional Status       07/03/17 103    Functional Status Current    Current Functional Level Comment Per Nursing Assessment    Functional Status Prior    Ambulation 0-->independent    Transferring 0-->independent    Toileting 0-->independent    Bathing 0-->independent    Dressing 0-->independent    Eating 0-->independent    Communication 0-->understands/communicates without difficulty    Swallowing 0-->swallows foods/liquids without difficulty    IADL    Medications independent    Meal Preparation independent    Housekeeping independent    Laundry independent    Shopping independent    Oral Care independent    Activity Tolerance    Current Activity Limitations none    Usual Activity Tolerance excellent    Current Activity Tolerance excellent    Employment/Financial    Employment/Finance Comments [employer]            Psychosocial     None            Abuse/Neglect     None            Legal     None            Substance Abuse     None            Patient Forms     None          Miroslava Vizcarra, RN

## 2017-07-03 NOTE — PLAN OF CARE
Problem: Fluid Volume Deficit (Adult)  Intervention: Monitor/Manage Hypovolemia    07/03/17 0432   Coping/Psychosocial Interventions   Environmental Support calm environment promoted;rest periods encouraged   Nutrition Interventions   Fluid/Electrolyte Management fluids provided;intravenous fluid replacement initiated   Monitor/Manage Hypovolemia   Nausea/Vomiting Interventions stimuli minimized;slow, deep breathing encouraged;sips clear liquids given         Goal: Identify Related Risk Factors and Signs and Symptoms    07/03/17 0432   Fluid Volume Deficit   Fluid Volume Deficit: Related Risk Factors vomiting/diarrhea;fluid access   Signs and Symptoms (Fluid Volume Deficit) headache;mucous membranes dry;postural hypotension       Goal: Fluid/Electrolyte Balance  Outcome: Ongoing (interventions implemented as appropriate)    07/03/17 0432   Fluid Volume Deficit (Adult)   Fluid/Electrolyte Balance making progress toward outcome       Goal: Comfort/Well Being  Outcome: Ongoing (interventions implemented as appropriate)    07/03/17 0432   Fluid Volume Deficit (Adult)   Comfort/Well Being making progress toward outcome

## 2017-07-05 LAB
BACTERIA SPEC AEROBE CULT: ABNORMAL
BACTERIA SPEC AEROBE CULT: ABNORMAL

## 2017-11-04 ENCOUNTER — APPOINTMENT (OUTPATIENT)
Dept: GENERAL RADIOLOGY | Facility: HOSPITAL | Age: 57
End: 2017-11-04

## 2017-11-04 ENCOUNTER — APPOINTMENT (OUTPATIENT)
Dept: CT IMAGING | Facility: HOSPITAL | Age: 57
End: 2017-11-04

## 2017-11-04 ENCOUNTER — HOSPITAL ENCOUNTER (OUTPATIENT)
Facility: HOSPITAL | Age: 57
Setting detail: OBSERVATION
Discharge: HOME | End: 2017-11-05
Attending: EMERGENCY MEDICINE | Admitting: INTERNAL MEDICINE

## 2017-11-04 DIAGNOSIS — R11.2 INTRACTABLE VOMITING WITH NAUSEA, UNSPECIFIED VOMITING TYPE: Primary | ICD-10-CM

## 2017-11-04 DIAGNOSIS — I88.0 MESENTERIC ADENITIS: ICD-10-CM

## 2017-11-04 DIAGNOSIS — K52.9 GASTROENTERITIS: ICD-10-CM

## 2017-11-04 PROBLEM — E03.9 HYPOTHYROID: Chronic | Status: ACTIVE | Noted: 2017-11-04

## 2017-11-04 PROBLEM — R73.9 HYPERGLYCEMIA: Status: ACTIVE | Noted: 2017-11-04

## 2017-11-04 PROBLEM — E87.20 LACTIC ACIDOSIS: Status: ACTIVE | Noted: 2017-11-04

## 2017-11-04 PROBLEM — D72.829 LEUKOCYTOSIS: Status: ACTIVE | Noted: 2017-11-04

## 2017-11-04 PROBLEM — E87.6 HYPOKALEMIA, GASTROINTESTINAL LOSSES: Status: RESOLVED | Noted: 2017-07-03 | Resolved: 2017-11-04

## 2017-11-04 PROBLEM — R94.31 PROLONGED Q-T INTERVAL ON ECG: Status: RESOLVED | Noted: 2017-07-02 | Resolved: 2017-11-04

## 2017-11-04 LAB
ALBUMIN SERPL-MCNC: 4.6 G/DL (ref 3.2–4.8)
ALBUMIN/GLOB SERPL: 1.8 G/DL (ref 1.5–2.5)
ALP SERPL-CCNC: 61 U/L (ref 25–100)
ALT SERPL W P-5'-P-CCNC: 35 U/L (ref 7–40)
ANION GAP SERPL CALCULATED.3IONS-SCNC: 12 MMOL/L (ref 3–11)
AST SERPL-CCNC: 34 U/L (ref 0–33)
BASOPHILS # BLD AUTO: 0.01 10*3/MM3 (ref 0–0.2)
BASOPHILS NFR BLD AUTO: 0.1 % (ref 0–1)
BILIRUB SERPL-MCNC: 0.8 MG/DL (ref 0.3–1.2)
BILIRUB UR QL STRIP: NEGATIVE
BUN BLD-MCNC: 20 MG/DL (ref 9–23)
BUN/CREAT SERPL: 25 (ref 7–25)
CALCIUM SPEC-SCNC: 9.4 MG/DL (ref 8.7–10.4)
CHLORIDE SERPL-SCNC: 105 MMOL/L (ref 99–109)
CLARITY UR: CLEAR
CO2 SERPL-SCNC: 21 MMOL/L (ref 20–31)
COLOR UR: YELLOW
CREAT BLD-MCNC: 0.8 MG/DL (ref 0.6–1.3)
D-LACTATE SERPL-SCNC: 1.3 MMOL/L (ref 0.5–2)
D-LACTATE SERPL-SCNC: 2.4 MMOL/L (ref 0.5–2)
DEPRECATED RDW RBC AUTO: 41 FL (ref 37–54)
EOSINOPHIL # BLD AUTO: 0.01 10*3/MM3 (ref 0–0.3)
EOSINOPHIL NFR BLD AUTO: 0.1 % (ref 0–3)
ERYTHROCYTE [DISTWIDTH] IN BLOOD BY AUTOMATED COUNT: 12.8 % (ref 11.3–14.5)
GFR SERPL CREATININE-BSD FRML MDRD: 74 ML/MIN/1.73
GLOBULIN UR ELPH-MCNC: 2.6 GM/DL
GLUCOSE BLD-MCNC: 156 MG/DL (ref 70–100)
GLUCOSE UR STRIP-MCNC: NEGATIVE MG/DL
HBA1C MFR BLD: 5.2 % (ref 4.8–5.6)
HCT VFR BLD AUTO: 41.6 % (ref 34.5–44)
HGB BLD-MCNC: 14.6 G/DL (ref 11.5–15.5)
HGB UR QL STRIP.AUTO: NEGATIVE
HOLD SPECIMEN: NORMAL
IMM GRANULOCYTES # BLD: 0.02 10*3/MM3 (ref 0–0.03)
IMM GRANULOCYTES NFR BLD: 0.2 % (ref 0–0.6)
KETONES UR QL STRIP: ABNORMAL
LEUKOCYTE ESTERASE UR QL STRIP.AUTO: NEGATIVE
LIPASE SERPL-CCNC: 36 U/L (ref 6–51)
LYMPHOCYTES # BLD AUTO: 0.83 10*3/MM3 (ref 0.6–4.8)
LYMPHOCYTES NFR BLD AUTO: 6.7 % (ref 24–44)
MCH RBC QN AUTO: 30.6 PG (ref 27–31)
MCHC RBC AUTO-ENTMCNC: 35.1 G/DL (ref 32–36)
MCV RBC AUTO: 87.2 FL (ref 80–99)
MONOCYTES # BLD AUTO: 0.55 10*3/MM3 (ref 0–1)
MONOCYTES NFR BLD AUTO: 4.5 % (ref 0–12)
NEUTROPHILS # BLD AUTO: 10.92 10*3/MM3 (ref 1.5–8.3)
NEUTROPHILS NFR BLD AUTO: 88.4 % (ref 41–71)
NITRITE UR QL STRIP: NEGATIVE
PH UR STRIP.AUTO: 7.5 [PH] (ref 5–8)
PLATELET # BLD AUTO: 263 10*3/MM3 (ref 150–450)
PMV BLD AUTO: 9.9 FL (ref 6–12)
POTASSIUM BLD-SCNC: 3.4 MMOL/L (ref 3.5–5.5)
PROT SERPL-MCNC: 7.2 G/DL (ref 5.7–8.2)
PROT UR QL STRIP: NEGATIVE
RBC # BLD AUTO: 4.77 10*6/MM3 (ref 3.89–5.14)
SODIUM BLD-SCNC: 138 MMOL/L (ref 132–146)
SP GR UR STRIP: 1.01 (ref 1–1.03)
T4 FREE SERPL-MCNC: 1.05 NG/DL (ref 0.89–1.76)
TROPONIN I SERPL-MCNC: <0.006 NG/ML
TSH SERPL DL<=0.05 MIU/L-ACNC: 3.39 MIU/ML (ref 0.35–5.35)
UROBILINOGEN UR QL STRIP: ABNORMAL
WBC NRBC COR # BLD: 12.34 10*3/MM3 (ref 3.5–10.8)
WHOLE BLOOD HOLD SPECIMEN: NORMAL
WHOLE BLOOD HOLD SPECIMEN: NORMAL

## 2017-11-04 PROCEDURE — 96361 HYDRATE IV INFUSION ADD-ON: CPT

## 2017-11-04 PROCEDURE — 83036 HEMOGLOBIN GLYCOSYLATED A1C: CPT | Performed by: FAMILY MEDICINE

## 2017-11-04 PROCEDURE — 84439 ASSAY OF FREE THYROXINE: CPT | Performed by: FAMILY MEDICINE

## 2017-11-04 PROCEDURE — 93005 ELECTROCARDIOGRAM TRACING: CPT | Performed by: EMERGENCY MEDICINE

## 2017-11-04 PROCEDURE — 80053 COMPREHEN METABOLIC PANEL: CPT | Performed by: EMERGENCY MEDICINE

## 2017-11-04 PROCEDURE — 71010 HC CHEST PA OR AP: CPT

## 2017-11-04 PROCEDURE — 25010000002 ONDANSETRON PER 1 MG: Performed by: EMERGENCY MEDICINE

## 2017-11-04 PROCEDURE — 74176 CT ABD & PELVIS W/O CONTRAST: CPT

## 2017-11-04 PROCEDURE — 84443 ASSAY THYROID STIM HORMONE: CPT | Performed by: FAMILY MEDICINE

## 2017-11-04 PROCEDURE — G0378 HOSPITAL OBSERVATION PER HR: HCPCS

## 2017-11-04 PROCEDURE — 99219 PR INITIAL OBSERVATION CARE/DAY 50 MINUTES: CPT | Performed by: FAMILY MEDICINE

## 2017-11-04 PROCEDURE — 99284 EMERGENCY DEPT VISIT MOD MDM: CPT

## 2017-11-04 PROCEDURE — 25010000002 FENTANYL CITRATE (PF) 100 MCG/2ML SOLUTION: Performed by: EMERGENCY MEDICINE

## 2017-11-04 PROCEDURE — 83690 ASSAY OF LIPASE: CPT | Performed by: EMERGENCY MEDICINE

## 2017-11-04 PROCEDURE — 83605 ASSAY OF LACTIC ACID: CPT | Performed by: EMERGENCY MEDICINE

## 2017-11-04 PROCEDURE — 84484 ASSAY OF TROPONIN QUANT: CPT | Performed by: PHYSICIAN ASSISTANT

## 2017-11-04 PROCEDURE — 25010000002 KETOROLAC TROMETHAMINE PER 15 MG: Performed by: EMERGENCY MEDICINE

## 2017-11-04 PROCEDURE — 25010000002 PROMETHAZINE PER 50 MG: Performed by: PHYSICIAN ASSISTANT

## 2017-11-04 PROCEDURE — 25010000002 PROCHLORPERAZINE EDISYLATE PER 10 MG: Performed by: EMERGENCY MEDICINE

## 2017-11-04 PROCEDURE — 25010000002 HYDROMORPHONE PER 4 MG: Performed by: EMERGENCY MEDICINE

## 2017-11-04 PROCEDURE — 81003 URINALYSIS AUTO W/O SCOPE: CPT | Performed by: EMERGENCY MEDICINE

## 2017-11-04 PROCEDURE — 96374 THER/PROPH/DIAG INJ IV PUSH: CPT

## 2017-11-04 PROCEDURE — 85025 COMPLETE CBC W/AUTO DIFF WBC: CPT | Performed by: EMERGENCY MEDICINE

## 2017-11-04 PROCEDURE — 96375 TX/PRO/DX INJ NEW DRUG ADDON: CPT

## 2017-11-04 PROCEDURE — 25010000002 LORAZEPAM PER 2 MG: Performed by: EMERGENCY MEDICINE

## 2017-11-04 RX ORDER — HYDROMORPHONE HYDROCHLORIDE 1 MG/ML
0.5 INJECTION, SOLUTION INTRAMUSCULAR; INTRAVENOUS; SUBCUTANEOUS ONCE
Status: COMPLETED | OUTPATIENT
Start: 2017-11-04 | End: 2017-11-04

## 2017-11-04 RX ORDER — ACETAMINOPHEN 650 MG/1
650 SUPPOSITORY RECTAL EVERY 6 HOURS PRN
Status: DISCONTINUED | OUTPATIENT
Start: 2017-11-04 | End: 2017-11-05 | Stop reason: HOSPADM

## 2017-11-04 RX ORDER — POTASSIUM CHLORIDE 750 MG/1
40 CAPSULE, EXTENDED RELEASE ORAL AS NEEDED
Status: DISCONTINUED | OUTPATIENT
Start: 2017-11-04 | End: 2017-11-05 | Stop reason: HOSPADM

## 2017-11-04 RX ORDER — LEVOTHYROXINE AND LIOTHYRONINE 19; 4.5 UG/1; UG/1
30 TABLET ORAL 2 TIMES DAILY
Status: DISCONTINUED | OUTPATIENT
Start: 2017-11-04 | End: 2017-11-05 | Stop reason: HOSPADM

## 2017-11-04 RX ORDER — POTASSIUM CHLORIDE 1.5 G/1.77G
40 POWDER, FOR SOLUTION ORAL AS NEEDED
Status: DISCONTINUED | OUTPATIENT
Start: 2017-11-04 | End: 2017-11-05 | Stop reason: HOSPADM

## 2017-11-04 RX ORDER — ONDANSETRON 2 MG/ML
4 INJECTION INTRAMUSCULAR; INTRAVENOUS ONCE
Status: COMPLETED | OUTPATIENT
Start: 2017-11-04 | End: 2017-11-04

## 2017-11-04 RX ORDER — ACETAMINOPHEN 325 MG/1
650 TABLET ORAL EVERY 6 HOURS PRN
Status: DISCONTINUED | OUTPATIENT
Start: 2017-11-04 | End: 2017-11-05 | Stop reason: HOSPADM

## 2017-11-04 RX ORDER — SODIUM CHLORIDE 0.9 % (FLUSH) 0.9 %
1-10 SYRINGE (ML) INJECTION AS NEEDED
Status: DISCONTINUED | OUTPATIENT
Start: 2017-11-04 | End: 2017-11-05 | Stop reason: HOSPADM

## 2017-11-04 RX ORDER — SODIUM CHLORIDE 9 MG/ML
100 INJECTION, SOLUTION INTRAVENOUS CONTINUOUS
Status: DISCONTINUED | OUTPATIENT
Start: 2017-11-04 | End: 2017-11-05 | Stop reason: HOSPADM

## 2017-11-04 RX ORDER — KETOROLAC TROMETHAMINE 30 MG/ML
30 INJECTION, SOLUTION INTRAMUSCULAR; INTRAVENOUS ONCE
Status: COMPLETED | OUTPATIENT
Start: 2017-11-04 | End: 2017-11-04

## 2017-11-04 RX ORDER — SODIUM CHLORIDE 0.9 % (FLUSH) 0.9 %
10 SYRINGE (ML) INJECTION AS NEEDED
Status: DISCONTINUED | OUTPATIENT
Start: 2017-11-04 | End: 2017-11-05 | Stop reason: HOSPADM

## 2017-11-04 RX ORDER — FENTANYL CITRATE 50 UG/ML
50 INJECTION, SOLUTION INTRAMUSCULAR; INTRAVENOUS ONCE
Status: COMPLETED | OUTPATIENT
Start: 2017-11-04 | End: 2017-11-04

## 2017-11-04 RX ORDER — PROCHLORPERAZINE 25 MG
25 SUPPOSITORY, RECTAL RECTAL EVERY 12 HOURS PRN
Status: DISCONTINUED | OUTPATIENT
Start: 2017-11-04 | End: 2017-11-05 | Stop reason: HOSPADM

## 2017-11-04 RX ORDER — PROMETHAZINE HYDROCHLORIDE 25 MG/ML
12.5 INJECTION, SOLUTION INTRAMUSCULAR; INTRAVENOUS ONCE
Status: COMPLETED | OUTPATIENT
Start: 2017-11-04 | End: 2017-11-04

## 2017-11-04 RX ORDER — LORAZEPAM 2 MG/ML
1 INJECTION INTRAMUSCULAR ONCE
Status: COMPLETED | OUTPATIENT
Start: 2017-11-04 | End: 2017-11-04

## 2017-11-04 RX ORDER — PROCHLORPERAZINE MALEATE 5 MG/1
5 TABLET ORAL EVERY 6 HOURS PRN
Status: DISCONTINUED | OUTPATIENT
Start: 2017-11-04 | End: 2017-11-05 | Stop reason: HOSPADM

## 2017-11-04 RX ORDER — POTASSIUM CHLORIDE 7.45 MG/ML
10 INJECTION INTRAVENOUS
Status: DISCONTINUED | OUTPATIENT
Start: 2017-11-04 | End: 2017-11-05 | Stop reason: HOSPADM

## 2017-11-04 RX ORDER — FAMOTIDINE 10 MG/ML
20 INJECTION, SOLUTION INTRAVENOUS EVERY 12 HOURS SCHEDULED
Status: DISCONTINUED | OUTPATIENT
Start: 2017-11-04 | End: 2017-11-05 | Stop reason: HOSPADM

## 2017-11-04 RX ORDER — FENTANYL CITRATE 50 UG/ML
25 INJECTION, SOLUTION INTRAMUSCULAR; INTRAVENOUS ONCE
Status: COMPLETED | OUTPATIENT
Start: 2017-11-04 | End: 2017-11-04

## 2017-11-04 RX ADMIN — SODIUM CHLORIDE 1000 ML: 9 INJECTION, SOLUTION INTRAVENOUS at 09:35

## 2017-11-04 RX ADMIN — SODIUM CHLORIDE 100 ML/HR: 9 INJECTION, SOLUTION INTRAVENOUS at 16:22

## 2017-11-04 RX ADMIN — PROMETHAZINE HYDROCHLORIDE 12.5 MG: 25 INJECTION INTRAMUSCULAR; INTRAVENOUS at 11:30

## 2017-11-04 RX ADMIN — KETOROLAC TROMETHAMINE 30 MG: 30 INJECTION, SOLUTION INTRAMUSCULAR at 14:35

## 2017-11-04 RX ADMIN — FENTANYL CITRATE 50 MCG: 50 INJECTION INTRAMUSCULAR; INTRAVENOUS at 10:12

## 2017-11-04 RX ADMIN — FENTANYL CITRATE 25 MCG: 50 INJECTION INTRAMUSCULAR; INTRAVENOUS at 09:33

## 2017-11-04 RX ADMIN — THYROID, PORCINE 30 MG: 30 TABLET ORAL at 18:41

## 2017-11-04 RX ADMIN — PROCHLORPERAZINE EDISYLATE 10 MG: 5 INJECTION INTRAMUSCULAR; INTRAVENOUS at 14:40

## 2017-11-04 RX ADMIN — FAMOTIDINE 20 MG: 10 INJECTION, SOLUTION INTRAVENOUS at 20:22

## 2017-11-04 RX ADMIN — SODIUM CHLORIDE 1640 ML: 9 INJECTION, SOLUTION INTRAVENOUS at 10:11

## 2017-11-04 RX ADMIN — LORAZEPAM 1 MG: 2 INJECTION INTRAMUSCULAR; INTRAVENOUS at 14:32

## 2017-11-04 RX ADMIN — HYDROMORPHONE HYDROCHLORIDE 0.5 MG: 1 INJECTION, SOLUTION INTRAMUSCULAR; INTRAVENOUS; SUBCUTANEOUS at 12:51

## 2017-11-04 RX ADMIN — ONDANSETRON 4 MG: 2 INJECTION INTRAMUSCULAR; INTRAVENOUS at 09:35

## 2017-11-04 RX ADMIN — SODIUM CHLORIDE 1000 ML: 9 INJECTION, SOLUTION INTRAVENOUS at 14:38

## 2017-11-04 NOTE — ED PROVIDER NOTES
Subjective   Patient is a 57 y.o. female presenting with abdominal pain.   History provided by:  Patient   used: No    Abdominal Pain   Pain location:  Epigastric  Pain quality: aching and cramping    Pain radiates to:  Does not radiate  Pain severity:  Severe  Onset quality:  Gradual  Duration:  12 hours  Timing:  Intermittent  Progression:  Waxing and waning  Chronicity:  New  Context: eating and retching    Context: not alcohol use, not diet changes, not previous surgeries, not recent illness, not sick contacts, not suspicious food intake and not trauma    Relieved by:  Nothing  Worsened by:  Eating  Ineffective treatments:  None tried  Associated symptoms: anorexia, nausea and vomiting    Associated symptoms: no chills, no constipation, no cough, no diarrhea, no dysuria, no fever, no flatus, no hematuria and no sore throat        Review of Systems   Constitutional: Negative for chills and fever.   HENT: Negative for mouth sores and sore throat.    Respiratory: Negative for cough, chest tightness and wheezing.    Gastrointestinal: Positive for abdominal pain, anorexia, nausea and vomiting. Negative for constipation, diarrhea and flatus.   Genitourinary: Negative for dysuria, frequency, hematuria and urgency.   Musculoskeletal: Negative for back pain and neck pain.   Skin: Negative for pallor and rash.   Psychiatric/Behavioral: Negative.    All other systems reviewed and are negative.      Past Medical History:   Diagnosis Date   • Disease of thyroid gland        No Known Allergies    Past Surgical History:   Procedure Laterality Date   •  SECTION     • CHOLECYSTECTOMY         History reviewed. No pertinent family history.    Social History     Social History   • Marital status:      Spouse name: N/A   • Number of children: N/A   • Years of education: N/A     Social History Main Topics   • Smoking status: Never Smoker   • Smokeless tobacco: None   • Alcohol use No   • Drug use:  No   • Sexual activity: Defer     Other Topics Concern   • None     Social History Narrative           Objective   Physical Exam   Constitutional: She is oriented to person, place, and time. She appears well-developed and well-nourished.   HENT:   Head: Normocephalic and atraumatic.   Right Ear: External ear normal.   Left Ear: External ear normal.   Nose: Nose normal.   Mouth/Throat: Oropharynx is clear and moist.   Eyes: Conjunctivae and EOM are normal. Pupils are equal, round, and reactive to light. No scleral icterus.   Neck: Normal range of motion. No thyromegaly present.   Cardiovascular: Normal rate, regular rhythm and normal heart sounds.    Pulmonary/Chest: Effort normal and breath sounds normal. No respiratory distress. She has no wheezes. She has no rales. She exhibits no tenderness.   Abdominal: Soft. Bowel sounds are normal. She exhibits no distension. There is tenderness (tenderness in the epigastrium.  Should no guarding or rebound or rigidity.).   Musculoskeletal: Normal range of motion.   Lymphadenopathy:     She has no cervical adenopathy.   Neurological: She is alert and oriented to person, place, and time. She has normal reflexes. She displays normal reflexes. No cranial nerve deficit. Coordination normal.   Skin: Skin is warm and dry.   Psychiatric: She has a normal mood and affect. Her behavior is normal. Judgment and thought content normal.   Nursing note and vitals reviewed.      Procedures         ED Course  ED Course        No results found for this or any previous visit (from the past 24 hour(s)).  Note: In addition to lab results from this visit, the labs listed above may include labs taken at another facility or during a different encounter within the last 24 hours. Please correlate lab times with ED admission and discharge times for further clarification of the services performed during this visit.    XR Chest 1 View   Final Result   No acute cardiopulmonary process.       DICTATED:      11/04/2017   EDITED:          11/05/2017       This report was finalized on 11/7/2017 7:36 AM by Dr. Kevan Esparza.          CT Abdomen Pelvis Without Contrast   Final Result   1. Mural thickening and adjacent inflammatory stranding of the mesentery   involving the proximal jejunum and distal  segment of the duodenum with   scattered adjacent mildly enlarged likely reactive lymph nodes.   Constellation of findings consistent with enteritis. No infectious or   inflammatory etiology.   2. No loculated intra-abdominal fluid collection or intraperitoneal free   air. No mechanical obstructive process identified.       DICTATED:     11/04/2017   EDITED:         11/04/2017           This report was finalized on 11/4/2017 6:24 PM by Dr. Kevan Esparza.            Vitals:    11/04/17 1916 11/05/17 0027 11/05/17 0424 11/05/17 0801   BP: 114/55 114/57 106/61 107/69   BP Location: Left arm Right arm Right arm Right arm   Patient Position: Lying Lying Lying Lying   Pulse: 82 97 76 68   Resp: 16 16 18 18   Temp: 99.7 °F (37.6 °C) 98.5 °F (36.9 °C) 98.2 °F (36.8 °C) 98.2 °F (36.8 °C)   TempSrc: Oral Oral Oral Oral   SpO2: 97% 94% 94%    Weight:       Height:         Medications   sodium chloride 0.9 % bolus 2,640 mL (0 mL Intravenous Stopped 11/4/17 1220)   ondansetron (ZOFRAN) injection 4 mg (4 mg Intravenous Given 11/4/17 0935)   sodium chloride 0.9 % bolus 1,000 mL (0 mL Intravenous Stopped 11/4/17 1015)   fentaNYL citrate (PF) (SUBLIMAZE) injection 25 mcg (25 mcg Intravenous Given 11/4/17 0933)   fentaNYL citrate (PF) (SUBLIMAZE) injection 50 mcg (50 mcg Intravenous Given 11/4/17 1012)   promethazine (PHENERGAN) injection 12.5 mg (12.5 mg Intravenous Given 11/4/17 1130)   HYDROmorphone (DILAUDID) injection 0.5 mg (0.5 mg Intravenous Given 11/4/17 1251)   LORazepam (ATIVAN) injection 1 mg (1 mg Intravenous Given 11/4/17 1432)   prochlorperazine (COMPAZINE) injection 10 mg (10 mg Intravenous Given 11/4/17 0386)   ketorolac  (TORADOL) injection 30 mg (30 mg Intravenous Given 11/4/17 1435)   sodium chloride 0.9 % bolus 1,000 mL (0 mL Intravenous Stopped 11/4/17 1622)     ECG/EMG Results (last 24 hours)     ** No results found for the last 24 hours. **                MDM  Number of Diagnoses or Management Options  new and requires workup  new and requires workup  new and requires workup     Amount and/or Complexity of Data Reviewed  Clinical lab tests: reviewed and ordered  Tests in the radiology section of CPT®: reviewed and ordered  Tests in the medicine section of CPT®: ordered and reviewed  Discuss the patient with other providers: yes        Final diagnoses:   Intractable vomiting with nausea, unspecified vomiting type   Mesenteric adenitis   Gastroenteritis            AMADO Rodrigez  11/10/17 6143

## 2017-11-04 NOTE — PLAN OF CARE
Problem: Patient Care Overview (Adult)  Goal: Plan of Care Review  Outcome: Ongoing (interventions implemented as appropriate)    11/04/17 1942   Coping/Psychosocial Response Interventions   Plan Of Care Reviewed With patient;sibling   Patient Care Overview   Progress no change   Outcome Evaluation   Outcome Summary/Follow up Plan pt nauseated, gagging, not resting well, febrile, otherwise VSS, sister at bedside       Goal: Adult Individualization and Mutuality  Outcome: Ongoing (interventions implemented as appropriate)  Goal: Discharge Needs Assessment  Outcome: Ongoing (interventions implemented as appropriate)    Problem: Infection, Risk/Actual (Adult)  Goal: Identify Related Risk Factors and Signs and Symptoms  Outcome: Ongoing (interventions implemented as appropriate)  Goal: Infection Prevention/Resolution  Outcome: Ongoing (interventions implemented as appropriate)    Problem: Pain, Acute (Adult)  Goal: Identify Related Risk Factors and Signs and Symptoms  Outcome: Ongoing (interventions implemented as appropriate)  Goal: Acceptable Pain Control/Comfort Level  Outcome: Ongoing (interventions implemented as appropriate)

## 2017-11-04 NOTE — H&P
[facility] Medicine Services  HISTORY AND PHYSICAL    Patient Name: Billie Foster  : 1960  MRN: [id number]  Primary Care Physician: LUL Rojo    Subjective   Subjective     Chief Complaint:  Vomiting and abdominal pain    HPI:  Billie Foster is a 57 y.o. female who presents to [facility] ED with persistent vomiting and epigastric abdominal pain since last night at about 9PM.  Information is gleaned from the record and from conversation with the ED provider as the patient is now sedated from compazine and ativan administration about 20 minutes before the time of my assessment.  Earlier during her ER stay, the patient had refractory pain and nausea after zofran, phenergan and dilaudid.  The patient denied fever or diarrhea.  No known sick contacts.  PMH includes hypothyroidsm and treatment for obesity with Contrave.  I do note that the patient was admitted in early July with similar issues and was discharged the next day.  On CT, there is mesenteric adenitis - by report (no official report yet).      Review of Systems   Unable to obtain due to patient's current level of sedation.    Otherwise 10-system ROS reviewed and is negative except as mentioned in the HPI.    Personal History     Past Medical History:   Diagnosis Date   • Disease of thyroid gland        Past Surgical History:   Procedure Laterality Date   •  SECTION     • CHOLECYSTECTOMY         Family History: family history is not on file.   Unable to obtain due the patient's current level of sedation    Social History:  reports that she has never smoked. She does not have any smokeless tobacco history on file. She reports that she does not drink alcohol or use illicit drugs.    Medications:    (Not in a hospital admission)    No Known Allergies    Objective   Objective     Vital Signs:   Temp:  [97.6 °F (36.4 °C)-98 °F (36.7 °C)] 98 °F (36.7 °C)  Heart Rate:  [] 101  Resp:   [24-30] 24  BP: (102-146)/(45-74) 136/63        Physical Exam   Constitutional: No acute distress, asleep, O2 sat 96%, rouses briefly to name but right back to sleep  Eyes: PERRLA, sclerae anicteric, no conjunctival injection  HENT: NCAT, mucous membranes dry  Neck: Supple, no thyromegaly, no lymphadenopathy, trachea midline  Respiratory: Clear to auscultation bilaterally, nonlabored respirations   Cardiovascular: RRR, no murmurs, rubs, or gallops, palpable pedal pulses bilaterally  Gastrointestinal: Positive bowel sounds, soft, no response to abdominal exam  Musculoskeletal: No bilateral ankle edema, no clubbing or cyanosis to extremities  Psychiatric: Unable to assess  Neurologic: Unable to assess  Skin: No rashes      Results Reviewed:  I have personally reviewed current lab, radiology, and data and agree.      Results from last 7 days  Lab Units 11/04/17  0915   WBC 10*3/mm3 12.34*   HEMOGLOBIN g/dL 14.6   HEMATOCRIT % 41.6   PLATELETS 10*3/mm3 263       Results from last 7 days  Lab Units 11/04/17  0915   SODIUM mmol/L 138   POTASSIUM mmol/L 3.4*   CHLORIDE mmol/L 105   CO2 mmol/L 21.0   BUN mg/dL 20   CREATININE mg/dL 0.80   GLUCOSE mg/dL 156*   CALCIUM mg/dL 9.4   ALT (SGPT) U/L 35   AST (SGOT) U/L 34*   TROPONIN I ng/mL <0.006     No results found for: BNP  No results found for: PHART  Imaging Results (last 24 hours)     Procedure Component Value Units Date/Time    CT Abdomen Pelvis Without Contrast [[id number]] Updated:  11/04/17 1155             Assessment/Plan   Assessment / Plan     Hospital Problem List     Vomiting    Hypokalemia, gastrointestinal losses    Hyperglycemia    Leukocytosis    Hypothyroid (Chronic)    Lactic acidosis            Assessment & Plan:  Intractable vomiting with epigastric pain  --Now controlled after compazine and ativan (IV) but was refractory to multiple agents\  --Will continue compazine PRN  --Will continue fluids at 100/hr  --Reflex lactic was WNL after 2 liters  --Check  TSH - was 0.9 in July  --Pepcid IV  --Consider GI consultation if not improved in AM      --Leukocytosis:  --Likely from gastroenteritis, but will check CXR  --UA was bland  --Recheck in AM    Hyperglycemia:  --Check A1c    Hypokalemia:  --Replace and recheck    DVT prophylaxis: LMWH    CODE STATUS:  No Order   FULL CODE    Admission Status:  I believe this patient meets OBS    Billie Cage MD   11/04/17   3:26 PM

## 2017-11-05 VITALS
HEART RATE: 68 BPM | WEIGHT: 212.8 LBS | SYSTOLIC BLOOD PRESSURE: 107 MMHG | DIASTOLIC BLOOD PRESSURE: 69 MMHG | TEMPERATURE: 98.2 F | BODY MASS INDEX: 37.7 KG/M2 | RESPIRATION RATE: 18 BRPM | HEIGHT: 63 IN | OXYGEN SATURATION: 94 %

## 2017-11-05 PROBLEM — K52.9 GASTROENTERITIS: Status: ACTIVE | Noted: 2017-11-04

## 2017-11-05 LAB
ANION GAP SERPL CALCULATED.3IONS-SCNC: 2 MMOL/L (ref 3–11)
BUN BLD-MCNC: 13 MG/DL (ref 9–23)
BUN/CREAT SERPL: 21.7 (ref 7–25)
CALCIUM SPEC-SCNC: 7.3 MG/DL (ref 8.7–10.4)
CHLORIDE SERPL-SCNC: 113 MMOL/L (ref 99–109)
CO2 SERPL-SCNC: 25 MMOL/L (ref 20–31)
CREAT BLD-MCNC: 0.6 MG/DL (ref 0.6–1.3)
DEPRECATED RDW RBC AUTO: 44.5 FL (ref 37–54)
ERYTHROCYTE [DISTWIDTH] IN BLOOD BY AUTOMATED COUNT: 13.5 % (ref 11.3–14.5)
GFR SERPL CREATININE-BSD FRML MDRD: 103 ML/MIN/1.73
GLUCOSE BLD-MCNC: 90 MG/DL (ref 70–100)
HCT VFR BLD AUTO: 33.8 % (ref 34.5–44)
HGB BLD-MCNC: 11.5 G/DL (ref 11.5–15.5)
MCH RBC QN AUTO: 30.8 PG (ref 27–31)
MCHC RBC AUTO-ENTMCNC: 34 G/DL (ref 32–36)
MCV RBC AUTO: 90.6 FL (ref 80–99)
PLATELET # BLD AUTO: 178 10*3/MM3 (ref 150–450)
PMV BLD AUTO: 9.8 FL (ref 6–12)
POTASSIUM BLD-SCNC: 3.2 MMOL/L (ref 3.5–5.5)
RBC # BLD AUTO: 3.73 10*6/MM3 (ref 3.89–5.14)
SODIUM BLD-SCNC: 140 MMOL/L (ref 132–146)
WBC NRBC COR # BLD: 8.09 10*3/MM3 (ref 3.5–10.8)

## 2017-11-05 PROCEDURE — 96376 TX/PRO/DX INJ SAME DRUG ADON: CPT

## 2017-11-05 PROCEDURE — 25010000002 ENOXAPARIN PER 10 MG: Performed by: FAMILY MEDICINE

## 2017-11-05 PROCEDURE — 85027 COMPLETE CBC AUTOMATED: CPT | Performed by: FAMILY MEDICINE

## 2017-11-05 PROCEDURE — G0378 HOSPITAL OBSERVATION PER HR: HCPCS

## 2017-11-05 PROCEDURE — 80048 BASIC METABOLIC PNL TOTAL CA: CPT | Performed by: FAMILY MEDICINE

## 2017-11-05 PROCEDURE — 99217 PR OBSERVATION CARE DISCHARGE MANAGEMENT: CPT | Performed by: INTERNAL MEDICINE

## 2017-11-05 PROCEDURE — 96372 THER/PROPH/DIAG INJ SC/IM: CPT

## 2017-11-05 RX ORDER — PROMETHAZINE HYDROCHLORIDE 12.5 MG/1
12.5 TABLET ORAL EVERY 6 HOURS PRN
Qty: 20 TABLET | Refills: 0 | Status: SHIPPED | OUTPATIENT
Start: 2017-11-05

## 2017-11-05 RX ORDER — ONDANSETRON 4 MG/1
4 TABLET, ORALLY DISINTEGRATING ORAL EVERY 8 HOURS PRN
Qty: 20 TABLET | Refills: 0 | Status: SHIPPED | OUTPATIENT
Start: 2017-11-05 | End: 2018-11-13 | Stop reason: SDUPTHER

## 2017-11-05 RX ADMIN — ENOXAPARIN SODIUM 40 MG: 40 INJECTION SUBCUTANEOUS at 08:26

## 2017-11-05 RX ADMIN — POTASSIUM CHLORIDE 40 MEQ: 750 CAPSULE, EXTENDED RELEASE ORAL at 08:26

## 2017-11-05 RX ADMIN — FAMOTIDINE 20 MG: 10 INJECTION, SOLUTION INTRAVENOUS at 08:26

## 2017-11-05 RX ADMIN — POTASSIUM CHLORIDE 40 MEQ: 750 CAPSULE, EXTENDED RELEASE ORAL at 12:07

## 2017-11-05 RX ADMIN — THYROID, PORCINE 30 MG: 30 TABLET ORAL at 08:26

## 2017-11-05 RX ADMIN — SODIUM CHLORIDE 100 ML/HR: 9 INJECTION, SOLUTION INTRAVENOUS at 06:24

## 2017-11-05 NOTE — DISCHARGE SUMMARY
[facility] Medicine Services  DISCHARGE SUMMARY    Patient Name: Billie Foster  : 1960  MRN: [id number]    Date of Admission: 2017  Date of Discharge:    Length of Stay: 0  Primary Care Physician: LUL Rojo    Consults     No orders found for last 30 day(s).        Hospital Course     Presenting Problem:   Intractable vomiting with nausea, unspecified vomiting type [R11.2]    Active Hospital Problems (** Indicates Principal Problem)    Diagnosis Date Noted   • Hyperglycemia [R73.9] 2017   • Leukocytosis [D72.829] 2017   • Hypothyroid [E03.9] 2017   • Lactic acidosis [E87.2] 2017   • Intractable vomiting with nausea [R11.2] 2017   • Vomiting [R11.10] 2017   • Hypokalemia, gastrointestinal losses [E87.6] 2017      Resolved Hospital Problems    Diagnosis Date Noted Date Resolved   No resolved problems to display.          Hospital Course:  Billie Foster is a 57 y.o. female who presented with vomiting and abdominal pain found to have enteritis.    Gastroenteritis: 57 year old female presenting from home with vomiting and abd pain. CT A/P on arrival showed findings c/w enteritis. She was treated conservatively with IVF and IV antiemetics and improved quickly. She was tolerating PO at time of d/c. She will follow up with PCP within 1 week. She wishes to establish with GI which she can do with Dr. Coleman in 2-4 weeks.           Day of Discharge     HPI: Up in bed with mother at bedside. Doing better. Wants to go home.    Review of Systems  Gen- No fevers, chills  CV- No chest pain, palpitations  Resp- No cough, dyspnea  GI- No N/V/D, abd pain    Otherwise ROS is negative except as mentioned in the HPI.    Vital Signs:   Temp:  [98 °F (36.7 °C)-100.3 °F (37.9 °C)] 98.2 °F (36.8 °C)  Heart Rate:  [] 68  Resp:  [16-28] 18  BP: (106-142)/(55-74) 107/69     Physical Exam:  Constitutional: No acute distress,  awake, alert  HENT: NCAT, mucous membranes moist  Respiratory: Clear to auscultation bilaterally, respiratory effort normal   Cardiovascular: RRR, no murmurs, rubs, or gallops, palpable pedal pulses bilaterally  Gastrointestinal: Positive bowel sounds, soft, nontender, nondistended  Musculoskeletal: No bilateral ankle edema  Psychiatric: Appropriate affect, cooperative  Neurologic: Oriented x 3, strength symmetric in all extremities, Cranial Nerves grossly intact to confrontation, speech clear  Skin: No rashes    Pertinent  and/or Most Recent Results         Results from last 7 days  Lab Units 11/05/17  0604 11/04/17  0915   WBC 10*3/mm3 8.09 12.34*   HEMOGLOBIN g/dL 11.5 14.6   HEMATOCRIT % 33.8* 41.6   PLATELETS 10*3/mm3 178 263   SODIUM mmol/L 140 138   POTASSIUM mmol/L 3.2* 3.4*   CHLORIDE mmol/L 113* 105   CO2 mmol/L 25.0 21.0   BUN mg/dL 13 20   CREATININE mg/dL 0.60 0.80   GLUCOSE mg/dL 90 156*   CALCIUM mg/dL 7.3* 9.4       Results from last 7 days  Lab Units 11/04/17  0915   BILIRUBIN mg/dL 0.8   ALK PHOS U/L 61   ALT (SGPT) U/L 35   AST (SGOT) U/L 34*           Invalid input(s): TG, LDLREALC    Results from last 7 days  Lab Units 11/04/17  0915   TSH mIU/mL 3.394   HEMOGLOBIN A1C % 5.20   TROPONIN I ng/mL <0.006     Brief Urine Lab Results  (Last result in the past 365 days)      Color   Clarity   Blood   Leuk Est   Nitrite   Protein   CREAT   Urine HCG        11/04/17 1102 Yellow Clear Negative Negative Negative Negative               Microbiology Results Abnormal     None          Imaging Results (all)     Procedure Component Value Units Date/Time    CT Abdomen Pelvis Without Contrast [[id number]] Collected:  11/04/17 1656     Updated:  11/04/17 1826    Narrative:       EXAMINATION: CT ABDOMEN PELVIS WO CONTRAST - 11/04/2017     INDICATION: Left flank pain, left lower quadrant pain.      TECHNIQUE: CT abdomen and pelvis without intravenous contrast.     The radiation dose reduction device was turned on  for each scan per the  ALARA (As Low as Reasonably Achievable) protocol.     COMPARISON: CT dated 7/02/2017.     FINDINGS: Lung bases demonstrate subsegmental atelectasis and/or  scarring. Liver without focal lesion. Gallbladder surgically absent.  Spleen is normal. Adrenal glands with thickening bilaterally however no  distinct nodule. Pancreas without suspicious lesion. No biliary or  pancreatic ductal dilatation. Scattered retroperitoneal and upper  abdominal lymph nodes as well as multiple scattered lymph nodes within  the root of the mesentery suspicious in number and not size. GI tract  evaluation demonstrates small hiatal hernia.     Mural thickening of the distal duodenum and proximal jejunum with  adjacent haziness in the root of the mesentery increased from prior  without focal fluid collection. No disproportionate dilatation of bowel.  Kidneys without hydronephrosis or obvious calculi. Pelvic viscera  unremarkable without bulky pelvic adenopathy or free pelvic fluid.  Multilevel degenerative changes of the spine without aggressive osseous  or soft tissue body wall findings of concern.       Impression:       1. Mural thickening and adjacent inflammatory stranding of the mesentery  involving the proximal jejunum and distal  segment of the duodenum with  scattered adjacent mildly enlarged likely reactive lymph nodes.  Constellation of findings consistent with enteritis. No infectious or  inflammatory etiology.  2. No loculated intra-abdominal fluid collection or intraperitoneal free  air. No mechanical obstructive process identified.     DICTATED:     11/04/2017  EDITED:         11/04/2017        This report was finalized on 11/4/2017 6:24 PM by Dr. Kevan Esparza.       XR Chest 1 View [[id number]] Collected:  11/05/17 0644     Updated:  11/05/17 1151    Narrative:          EXAMINATION: XR CHEST, SINGLE VIEW - 11/05/2017     INDICATION: R11.2-Nausea with vomiting, unspecified; I88.0-Nonspecific  mesenteric  lymphadenitis; K52.9-Noninfective gastroenteritis and  colitis, unspecified.      COMPARISON: None.     FINDINGS: Cardiac size borderline enlarged. Bibasilar atelectasis  without focal airspace opacity or overt edema. No pneumothorax or  pleural effusion. No acute osseous abnormality.           Impression:       No acute cardiopulmonary process.     DICTATED:     11/04/2017  EDITED:          11/05/2017                       Discharge Details      Billie Foster   Home Medication Instructions DOMINIC:[id number]    Printed on:11/05/17 1226   Medication Information                      estradiol (CLIMARA) 0.075 MG/24HR patch  Place 1 patch on the skin 2 (Two) Times a Week.             estradiol (ESTRACE) 0.1 MG/GM vaginal cream  Insert 2 g into the vagina 2 (Two) Times a Week.             naltrexone-bupropion ER (CONTRAVE) 8-90 MG tablet  Take 4 tablets by mouth Daily.             ondansetron ODT (ZOFRAN ODT) 4 MG disintegrating tablet  Take 1 tablet by mouth Every 8 (Eight) Hours As Needed for Nausea or Vomiting.             progesterone (PROMETRIUM) 100 MG capsule  Take 100 mg by mouth Every Night.             promethazine (PHENERGAN) 12.5 MG tablet  Take 1 tablet by mouth Every 6 (Six) Hours As Needed for Nausea or Vomiting.             Thyroid (NATURE-THROID) 16.25 MG PO tablet  Take 16.25 mg by mouth 2 (Two) Times a Day. Take with 32.5 mg tablet             Thyroid (NATURE-THROID) 32.5 MG PO tablet  Take 32.5 mg by mouth 2 (Two) Times a Day. Take with 16.25 mg tablet                   Discharge Disposition:  Home or Self Care    Discharge Diet: Home      Discharge Activity: As tolerated    No future appointments.    Additional Instructions for the Follow-ups that You Need to Schedule     Discharge Follow-up with PCP    As directed    Follow Up Details:  1 week hospital follow up       Discharge Follow-up with Specialty    As directed    Specialty:  GI - Dr Coleman   Follow Up:  2 Weeks   Follow Up Details:  New  patient eval, EGD                 Time Spent on Discharge:  35 minutes    Tonya Stapleton II, DO  11/05/17  12:26 PM

## 2018-01-02 ENCOUNTER — TRANSCRIBE ORDERS (OUTPATIENT)
Dept: ADMINISTRATIVE | Facility: HOSPITAL | Age: 58
End: 2018-01-02

## 2018-01-02 DIAGNOSIS — Z12.31 VISIT FOR SCREENING MAMMOGRAM: Primary | ICD-10-CM

## 2018-01-16 ENCOUNTER — HOSPITAL ENCOUNTER (OUTPATIENT)
Dept: MAMMOGRAPHY | Facility: HOSPITAL | Age: 58
Discharge: HOME | End: 2018-01-16
Admitting: NURSE PRACTITIONER

## 2018-01-16 DIAGNOSIS — Z12.31 VISIT FOR SCREENING MAMMOGRAM: ICD-10-CM

## 2018-01-16 PROCEDURE — 77063 BREAST TOMOSYNTHESIS BI: CPT | Performed by: RADIOLOGY

## 2018-01-16 PROCEDURE — 77067 SCR MAMMO BI INCL CAD: CPT

## 2018-01-16 PROCEDURE — 77063 BREAST TOMOSYNTHESIS BI: CPT

## 2018-01-16 PROCEDURE — 77067 SCR MAMMO BI INCL CAD: CPT | Performed by: RADIOLOGY

## 2018-02-23 ENCOUNTER — APPOINTMENT (OUTPATIENT)
Dept: MAMMOGRAPHY | Facility: HOSPITAL | Age: 58
End: 2018-02-23

## 2018-03-13 ENCOUNTER — CONSULT (OUTPATIENT)
Dept: ONCOLOGY | Facility: CLINIC | Age: 58
End: 2018-03-13

## 2018-03-13 ENCOUNTER — APPOINTMENT (OUTPATIENT)
Dept: LAB | Facility: HOSPITAL | Age: 58
End: 2018-03-13

## 2018-03-13 VITALS
BODY MASS INDEX: 35.51 KG/M2 | HEART RATE: 73 BPM | SYSTOLIC BLOOD PRESSURE: 139 MMHG | TEMPERATURE: 97.3 F | DIASTOLIC BLOOD PRESSURE: 61 MMHG | HEIGHT: 62 IN | RESPIRATION RATE: 16 BRPM | WEIGHT: 193 LBS

## 2018-03-13 DIAGNOSIS — R77.8 ABNORMAL SPEP: Primary | ICD-10-CM

## 2018-03-13 PROCEDURE — 84165 PROTEIN E-PHORESIS SERUM: CPT | Performed by: INTERNAL MEDICINE

## 2018-03-13 PROCEDURE — 84155 ASSAY OF PROTEIN SERUM: CPT | Performed by: INTERNAL MEDICINE

## 2018-03-13 PROCEDURE — 86335 IMMUNFIX E-PHORSIS/URINE/CSF: CPT | Performed by: INTERNAL MEDICINE

## 2018-03-13 PROCEDURE — 83883 ASSAY NEPHELOMETRY NOT SPEC: CPT | Performed by: INTERNAL MEDICINE

## 2018-03-13 PROCEDURE — 36415 COLL VENOUS BLD VENIPUNCTURE: CPT | Performed by: INTERNAL MEDICINE

## 2018-03-13 PROCEDURE — 86334 IMMUNOFIX E-PHORESIS SERUM: CPT | Performed by: INTERNAL MEDICINE

## 2018-03-13 PROCEDURE — 99204 OFFICE O/P NEW MOD 45 MIN: CPT | Performed by: INTERNAL MEDICINE

## 2018-03-13 PROCEDURE — 82784 ASSAY IGA/IGD/IGG/IGM EACH: CPT | Performed by: INTERNAL MEDICINE

## 2018-03-13 RX ORDER — OMEPRAZOLE 20 MG/1
20 CAPSULE, DELAYED RELEASE ORAL AS NEEDED
COMMUNITY

## 2018-03-13 RX ORDER — BUPROPION HYDROCHLORIDE 300 MG/1
300 TABLET ORAL DAILY
COMMUNITY

## 2018-03-13 NOTE — PROGRESS NOTES
Subjective     PROBLEM LIST:  1. Abnormal SPEP  2. Leukocytosis  3. Hypothyroidism       CHIEF COMPLAINT: abnormal SPEP      HISTORY OF PRESENT ILLNESS:  The patient is a 57 y.o. year old female, referred for evaluation of possible hereditary angioedema.  She had 2 episodes of severe abdominal pain, first in July and then again in November.  She was evaluated by Dr. Dow with a upper and lower endoscopy.  She had a CAT scan of the abdomen which did show some small bowel inflammation.  However on push enteroscopy her small bowel appeared normal.  She says at the time she was under a lot of stress and was taking a lot of supplements as well as Wellbutrin for weight control.  After the second episode in November she stopped taking all of the supplements and has not had any problems since then.  Hereditary angioedema was brought up as a possible explanation for her symptoms.  However subsequent workup has not been consistent with this.  As part of that workup she had a serum protein electrophoresis and free light chain level checked.  Her lambda free light chain was slightly decreased and there was an abnormal ratio which led to this referral.    REVIEW OF SYSTEMS:  A 14 point review of systems was performed and is negative except as noted above.    Past Medical History:   Diagnosis Date   • Anxiety    • Disease of thyroid gland          Current Outpatient Prescriptions on File Prior to Visit   Medication Sig Dispense Refill   • estradiol (CLIMARA) 0.075 MG/24HR patch Place 1 patch on the skin 2 (Two) Times a Week.     • estradiol (ESTRACE) 0.1 MG/GM vaginal cream Insert 2 g into the vagina 2 (Two) Times a Week.     • ondansetron ODT (ZOFRAN ODT) 4 MG disintegrating tablet Take 1 tablet by mouth Every 8 (Eight) Hours As Needed for Nausea or Vomiting. 20 tablet 0   • progesterone (PROMETRIUM) 100 MG capsule Take 100 mg by mouth Every Night.     • promethazine (PHENERGAN) 12.5 MG tablet Take 1 tablet by mouth Every 6  "(Six) Hours As Needed for Nausea or Vomiting. 20 tablet 0   • Thyroid (NATURE-THROID) 16.25 MG PO tablet Take 16.25 mg by mouth 2 (Two) Times a Day. Take with 32.5 mg tablet     • Thyroid (NATURE-THROID) 32.5 MG PO tablet Take 32.5 mg by mouth 2 (Two) Times a Day. Take with 16.25 mg tablet     • [DISCONTINUED] naltrexone-bupropion ER (CONTRAVE) 8-90 MG tablet Take 4 tablets by mouth Daily.       No current facility-administered medications on file prior to visit.        No Known Allergies    Past Surgical History:   Procedure Laterality Date   • ABDOMINOPLASTY  2018   •  SECTION      x2;  &    • CHOLECYSTECTOMY     • COLONOSCOPY  2017   • ENDOSCOPY  2017       Social History     Social History   • Marital status:      Social History Main Topics   • Smoking status: Never Smoker   • Smokeless tobacco: Never Used   • Alcohol use No   • Drug use: No   • Sexual activity: Defer     Other Topics Concern   • Not on file       Family History   Problem Relation Age of Onset   • Heart failure Mother    • Heart failure Father    • Colon cancer Maternal Aunt    • Colon cancer Cousin    • Breast cancer Neg Hx    • Ovarian cancer Neg Hx        Objective     /61 Comment: LUE  Pulse 73   Temp 97.3 °F (36.3 °C) (Temporal Artery )   Resp 16   Ht 156.2 cm (61.5\")   Wt 87.5 kg (193 lb)   BMI 35.88 kg/m²   Performance Status: 0  General: well appearing female in no acute distress  Neuro: alert and oriented  HEENT: sclera anicteric, oropharynx clear  Lymphatics: no cervical, supraclavicular, or axillary adenopathy  Cardiovascular: regular rate and rhythm, no murmurs  Lungs: clear to auscultation bilaterally  Abdomen: soft, nontender, nondistended.  No palpable organomegaly  Extremeties: no lower extremity edema  Skin: no rashes, lesions, bruising, or petechiae  Psych: mood and affect appropriate    Labs: Outside labs reviewed.  CBC is normal.  Immunoglobulin levels are within normal limits. "  Serum protein electrophoresis shows no evidence of a monoclonal protein.  Lambda free light chain was 63, with a kappa lambda ratio of 2.84        Assessment/Plan     Billie Foster is a 57 y.o. year old female referred for evaluation of abnormal free light chain levels.  These were drawn in the setting of evaluation for hereditary angioedema which she apparently does not have.  She does not have any other blood work that would be suggestive of a monoclonal gammopathy or myeloma.  She has no symptoms that suggest a hematologic malignancy.  We discussed the lab results.  She does have an elevated kappa lambda ratio, but this elevation is caused by a lambda light chain that is slightly below the normal range.  I think this is unlikely to be indicative of a plasma cell disorder.  I will recheck her free light chains and serum protein electrophoresis along with immunofixation today.  We will also do an immunofix on her urine.  I will contact her by phone with the results.  As long as the findings are unchanged for normal I do not think she needs further hematology follow-up.    I discussed over the phone with Dr. Schultz.           Elaine Martinez MD    3/13/2018

## 2018-03-14 LAB
ALBUMIN SERPL-MCNC: 3.9 G/DL (ref 2.9–4.4)
ALBUMIN/GLOB SERPL: 1.6 {RATIO} (ref 0.7–1.7)
ALPHA1 GLOB FLD ELPH-MCNC: 0.3 G/DL (ref 0–0.4)
ALPHA2 GLOB SERPL ELPH-MCNC: 0.9 G/DL (ref 0.4–1)
B-GLOBULIN SERPL ELPH-MCNC: 0.9 G/DL (ref 0.7–1.3)
GAMMA GLOB SERPL ELPH-MCNC: 0.6 G/DL (ref 0.4–1.8)
GLOBULIN SER CALC-MCNC: 2.6 G/DL (ref 2.2–3.9)
IGA SERPL-MCNC: 124 MG/DL (ref 87–352)
IGG SERPL-MCNC: 562 MG/DL (ref 700–1600)
IGM SERPL-MCNC: 68 MG/DL (ref 26–217)
INTERPRETATION SERPL IEP-IMP: ABNORMAL
INTERPRETATION UR IFE-IMP: NORMAL
KAPPA LC SERPL-MCNC: 11.5 MG/L (ref 3.3–19.4)
KAPPA LC/LAMBDA SER: 1.64 {RATIO} (ref 0.26–1.65)
LAMBDA LC FREE SERPL-MCNC: 7 MG/L (ref 5.7–26.3)
Lab: ABNORMAL
M-SPIKE: ABNORMAL G/DL
PROT SERPL-MCNC: 6.5 G/DL (ref 6–8.5)

## 2018-11-07 NOTE — PROGRESS NOTES
"Adult New Patient Visit:  Patient Care Team:  Elaine Escobar MD as PCP - General (Internal Medicine)    Chief Complaint   Patient presents with   • Establish Care   • Dizziness     pt has vertigo that started 2 years ago, saw ENT 2017 has gone away but came back in august and stayed steady        Billie Foster is a 58 y.o. female who presents to establish care. Has not had a PCP recently, getting most of her care through GYN ([facility]).    HPI Issues discussed today:     1. Abdominal Pain:  Has had several ED visits and prior work up for recurrent episodes of abd pain. She did have a CT abdomen 11/2017 which showed some mild thickening of the proximal jejunum and distal duodenum. Subsequently she had a colonoscopy, small bowel push enteroscopy and EGD with Dr. Dow which reportedly were normal. She was also evaluated by All[facility] and Immunology for ?HAE who felt her sx were not c/w this disorder. She also saw Dr. Martinez (Oncology) 3/2018 for abnl free light chain levels who thought her findings were due to a slightly below normal lambda light chain. Repeat labs were normal and no further H/O follow was recommended. Has not had abd pain since.    2. Vertigo:  Had an episode Jan 2017 with vertigo (\"couldn't keep head up, vomiting\"). Was dx'd with vertigo clinically (no head imaging done). Had some \"ringing in her ears\" as well. Saw ENT (Dr. Duke) who was concerned for ?Meniere's disease although testing reportedly negative. Done well since until August 2018 when she's had episodes on/off of vertigo (feels like room is spinning); typically worse with positional changes (i.e. Rolling over in bed) and can last for 1-2d at a time. Takes Meclizine, Zofran and/or Phengran which help. Also reports \"buzzing\" and ringing in her ears. 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 No focal weakness/numbness, difficulty speaking, vision changes.     3. Anxiety:  Stable on Welbutrin XL 300mg " "since . No SI/HI. Sleep and appetite ok. Doesn't follow with mental health.      4. ?Hypothyroidism:  [facility] reportedly checked thyroid labs and said were normal but a holistic medicine provider Aileen Endaking said that she was \"borderline\" and started her a few years ago on Nature-Throid BID. Pt wondering if she needs to take this. Denies hair/skin changes, constipation, cold intolerance.    5. Left leg rash:  For last 1 mo, stable. Describes erythematous, puritic patch to shin with flaking skin. Not responding to OTC hydrocortisone, lotion/\"oils.\" Has seen derm in remote past for this, was given ?steroid injection.    Review of Systems   Constitutional: Negative for activity change, appetite change and fever.   HENT: Negative for congestion, sneezing and sore throat.    Eyes: Negative for discharge and visual disturbance.   Respiratory: Negative for cough and shortness of breath.    Cardiovascular: Negative for chest pain and palpitations.   Gastrointestinal: Negative for abdominal distention, abdominal pain, blood in stool, constipation, nausea and vomiting.   Endocrine: Negative for cold intolerance and heat intolerance.   Genitourinary: Negative for dysuria.   Musculoskeletal: Negative for neck stiffness.   Skin: Positive for rash.   Allergic/Immunologic: Negative for environmental allergies and food allergies.   Neurological: Positive for dizziness (vertigo). Negative for tremors, seizures, syncope, speech difficulty, weakness, light-headedness, numbness, headache and memory problem.   Hematological: Negative for adenopathy.   Psychiatric/Behavioral: Negative for depressed mood.        History  Past Medical History:   Diagnosis Date   • Anxiety    • Disease of thyroid gland       Past Surgical History:   Procedure Laterality Date   • ABDOMINOPLASTY  2018   •  SECTION      x2;  &    • CHOLECYSTECTOMY     • COLONOSCOPY  2017   • ENDOSCOPY  2017      No Known Allergies "   Family History   Problem Relation Age of Onset   • Heart failure Mother    • Heart failure Father    • Colon cancer Maternal Aunt    • Colon cancer Cousin    • Breast cancer Neg Hx    • Ovarian cancer Neg Hx       Social History     Socioeconomic History   • Marital status:      Spouse name: Not on file   • Number of children: Not on file   • Years of education: Not on file   • Highest education level: Not on file   Social Needs   • Financial resource strain: Not on file   • Food insecurity - worry: Not on file   • Food insecurity - inability: Not on file   • Transportation needs - medical: Not on file   • Transportation needs - non-medical: Not on file   Occupational History   • Not on file   Tobacco Use   • Smoking status: Never Smoker   • Smokeless tobacco: Never Used   Substance and Sexual Activity   • Alcohol use: No   • Drug use: No   • Sexual activity: Defer   Other Topics Concern   • Not on file   Social History Narrative   • Not on file        Current Outpatient Medications:   •  buPROPion XL (WELLBUTRIN XL) 300 MG 24 hr tablet, Take 300 mg by mouth Daily., Disp: , Rfl:   •  estradiol (CLIMARA) 0.075 MG/24HR patch, Place 1 patch on the skin 2 (Two) Times a Week., Disp: , Rfl:   •  estradiol (ESTRACE) 0.1 MG/GM vaginal cream, Insert 2 g into the vagina 2 (Two) Times a Week., Disp: , Rfl:   •  ondansetron ODT (ZOFRAN ODT) 4 MG disintegrating tablet, Take 1 tablet by mouth Every 8 (Eight) Hours As Needed for Nausea or Vomiting., Disp: 20 tablet, Rfl: 0  •  progesterone (PROMETRIUM) 100 MG capsule, Take 100 mg by mouth Every Night., Disp: , Rfl:   •  promethazine (PHENERGAN) 12.5 MG tablet, Take 1 tablet by mouth Every 6 (Six) Hours As Needed for Nausea or Vomiting., Disp: 20 tablet, Rfl: 0  •  Thyroid (NATURE-THROID) 16.25 MG PO tablet, Take 16.25 mg by mouth 2 (Two) Times a Day. Take with 32.5 mg tablet, Disp: , Rfl:   •  Thyroid (NATURE-THROID) 32.5 MG PO tablet, Take 32.5 mg by mouth 2 (Two) Times  "a Day. Take with 16.25 mg tablet, Disp: , Rfl:   •  meclizine 25 MG chewable tablet chewable tablet, Chew 1 tablet 3 (Three) Times a Day As Needed (dizziness)., Disp: 30 each, Rfl: 1  •  omeprazole (priLOSEC) 20 MG capsule, Take 20 mg by mouth As Needed., Disp: , Rfl:   •  triamcinolone (KENALOG) 0.025 % ointment, Apply  topically to the appropriate area as directed 3 (Three) Times a Day for 7 days., Disp: 15 g, Rfl: 0    Health Maintenance   Topic Date Due   • ANNUAL PHYSICAL  06/10/1963   • TDAP/TD VACCINES (1 - Tdap) 06/10/1979   • ZOSTER VACCINE (1 of 2) 06/10/2010   • HEPATITIS C SCREENING  2017   • PAP SMEAR  2020   • MAMMOGRAM  2020   • COLONOSCOPY  2027   • INFLUENZA VACCINE  Completed       Immunizations  Td/Tdap(Booster Q 10 yrs):  Advised. Pt to d/w insurance or pharmacy to determine coverage.   Flu (Yearly):  UTD  Shringrix:  Advised. Pt to d/w insurance or pharmacy to determine coverage.   Immunization History   Administered Date(s) Administered   • Influenza, Unspecified 10/24/2018       Hemoglobin A1C   Date Value Ref Range Status   2017 5.20 4.80 - 5.60 % Final     Lab Results   Component Value Date    CHLPL 192 2015    TRIG 47 2015    HDL 59 2015     (H) 2015       Colorectal Screenin17; due 5 year (?HAE)  Pap:  2018  Mammogram:  UTD, 2018  PSA(Over age 50):  N/A  US Aorta (For male smokers, age 65):  N/A  CT for Smoker (Age 55-75, 30pk yr):  N/A  Bone Density/DEXA:  Had 2 years ago  Hep C ( [id number]):  Check with RPE/next lab draw    Vitals:    18 1332   BP: 120/60   BP Location: Right arm   Patient Position: Sitting   Cuff Size: Adult   Pulse: 76   Resp: 18   Temp: 98.2 °F (36.8 °C)   Weight: 93.2 kg (205 lb 6.4 oz)   Height: 156.2 cm (61.5\")         Physical Exam   Constitutional: She is oriented to person, place, and time. She appears well-developed and well-nourished. No distress.   HENT:   Head: " Normocephalic and atraumatic.   Right Ear: Tympanic membrane normal.   Left Ear: Tympanic membrane normal.   Mouth/Throat: Oropharynx is clear and moist. No oropharyngeal exudate.   Eyes: Conjunctivae and EOM are normal. Pupils are equal, round, and reactive to light. Right eye exhibits no discharge. Left eye exhibits no discharge.   Neck: Normal range of motion. Neck supple. No JVD present. No tracheal deviation present. No thyromegaly present.   Cardiovascular: Normal rate, regular rhythm and normal heart sounds. Exam reveals no gallop and no friction rub.   No murmur heard.  Pulmonary/Chest: Effort normal and breath sounds normal. No stridor. No respiratory distress. She has no wheezes. She has no rales.   Abdominal: Soft. Bowel sounds are normal. She exhibits no distension and no mass. There is no tenderness. There is no guarding.   Musculoskeletal: Normal range of motion. She exhibits no edema.   Lymphadenopathy:     She has no cervical adenopathy.   Neurological: She is alert and oriented to person, place, and time. She has normal strength and normal reflexes. She displays no tremor. No cranial nerve deficit or sensory deficit. She exhibits normal muscle tone. She displays a negative Romberg sign. Coordination and gait normal.   +aracelis hallpike bilaterally   Skin: Skin is warm and dry. Capillary refill takes less than 2 seconds. Rash (Approx 3cm erythematous patch with overlying dry skin. No vescles, drainge. On left shin.) noted.   Psychiatric: She has a normal mood and affect.   Vitals reviewed.       Assessment and Plan: 58 y.o. female here to establish care.  Other specified hypothyroidism  Unclear if pt has hypothyroidism and/or need thyroid hormone replacement given reportedly normal labs with [facility]. Will check TSH, free T3 and free T4 today. Pt can consider stopping medication and repeating labs in 6 weeks. If abnormal would resume thyroid replacement therapy (but would use Synthroid).    Benign  paroxysmal positional vertigo due to bilateral vestibular disorder  No red flags like focal neuro changes. 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  -Discussed home Epley Maneuvers  -Rx Zofran and Meclizine PRN  -refer to ENT (Dr. Duke)  -f/u in 4-6 weeks; if still symptomatic would consider imaging  -advised to seek immediate medical care for persistent vertigo, vision changes, weakness/numbness or trouble speaking.    Rash in adult  Suspect atopic derm vs psoriasis.  -Rx Triamcinolone 0.025% TID x 7d  -if not improving consider derm referal    Anxiety  Stable on Wellbutrin XL 300mg daily. Continue.     Healthcare Maintenance:   1. RTC for RPE  2. Obtain prior records  3. Fasting; check thyroid labs, CMP, A1c, lipids today  4. UTD on mammogram/pap smear  5. Due for colonoscopy as above  6. Discussed immunizations as above    Return in about 4 weeks (around 12/11/2018) for Follow-up.    Elaine Escobar MD  11/13/2018

## 2018-11-13 ENCOUNTER — OFFICE VISIT (OUTPATIENT)
Dept: INTERNAL MEDICINE | Facility: CLINIC | Age: 58
End: 2018-11-13

## 2018-11-13 VITALS
HEART RATE: 76 BPM | BODY MASS INDEX: 37.8 KG/M2 | RESPIRATION RATE: 18 BRPM | SYSTOLIC BLOOD PRESSURE: 120 MMHG | HEIGHT: 62 IN | WEIGHT: 205.4 LBS | DIASTOLIC BLOOD PRESSURE: 60 MMHG | TEMPERATURE: 98.2 F

## 2018-11-13 DIAGNOSIS — E66.9 CLASS 2 OBESITY WITH BODY MASS INDEX (BMI) OF 38.0 TO 38.9 IN ADULT, UNSPECIFIED OBESITY TYPE, UNSPECIFIED WHETHER SERIOUS COMORBIDITY PRESENT: ICD-10-CM

## 2018-11-13 DIAGNOSIS — Z13.1 ENCOUNTER FOR SCREENING FOR DIABETES MELLITUS: ICD-10-CM

## 2018-11-13 DIAGNOSIS — H81.13 BENIGN PAROXYSMAL POSITIONAL VERTIGO DUE TO BILATERAL VESTIBULAR DISORDER: Primary | ICD-10-CM

## 2018-11-13 DIAGNOSIS — R21 RASH IN ADULT: ICD-10-CM

## 2018-11-13 DIAGNOSIS — E03.9 HYPOTHYROIDISM, UNSPECIFIED TYPE: ICD-10-CM

## 2018-11-13 DIAGNOSIS — F41.9 ANXIETY: ICD-10-CM

## 2018-11-13 DIAGNOSIS — Z13.6 ENCOUNTER FOR LIPID SCREENING FOR CARDIOVASCULAR DISEASE: ICD-10-CM

## 2018-11-13 DIAGNOSIS — Z13.220 ENCOUNTER FOR LIPID SCREENING FOR CARDIOVASCULAR DISEASE: ICD-10-CM

## 2018-11-13 PROBLEM — R11.10 VOMITING: Status: RESOLVED | Noted: 2017-07-03 | Resolved: 2018-11-13

## 2018-11-13 PROBLEM — E03.8 OTHER SPECIFIED HYPOTHYROIDISM: Chronic | Status: ACTIVE | Noted: 2017-11-04

## 2018-11-13 PROBLEM — K52.9 GASTROENTERITIS: Status: RESOLVED | Noted: 2017-11-04 | Resolved: 2018-11-13

## 2018-11-13 PROBLEM — D72.829 LEUKOCYTOSIS: Status: RESOLVED | Noted: 2017-11-04 | Resolved: 2018-11-13

## 2018-11-13 PROBLEM — R73.9 HYPERGLYCEMIA: Status: RESOLVED | Noted: 2017-11-04 | Resolved: 2018-11-13

## 2018-11-13 PROBLEM — E87.20 LACTIC ACIDOSIS: Status: RESOLVED | Noted: 2017-11-04 | Resolved: 2018-11-13

## 2018-11-13 PROBLEM — R77.8 ABNORMAL SPEP: Status: RESOLVED | Noted: 2018-03-13 | Resolved: 2018-11-13

## 2018-11-13 PROBLEM — E87.6 HYPOKALEMIA, GASTROINTESTINAL LOSSES: Status: RESOLVED | Noted: 2017-07-03 | Resolved: 2018-11-13

## 2018-11-13 LAB
ALBUMIN SERPL-MCNC: 4.48 G/DL (ref 3.2–4.8)
ALBUMIN/GLOB SERPL: 2.3 G/DL (ref 1.5–2.5)
ALP SERPL-CCNC: 74 U/L (ref 25–100)
ALT SERPL W P-5'-P-CCNC: 33 U/L (ref 7–40)
ANION GAP SERPL CALCULATED.3IONS-SCNC: 7 MMOL/L (ref 3–11)
ARTICHOKE IGE QN: 158 MG/DL (ref 0–130)
AST SERPL-CCNC: 28 U/L (ref 0–33)
BILIRUB SERPL-MCNC: 0.5 MG/DL (ref 0.3–1.2)
BUN BLD-MCNC: 11 MG/DL (ref 9–23)
BUN/CREAT SERPL: 14.3 (ref 7–25)
CALCIUM SPEC-SCNC: 9.3 MG/DL (ref 8.7–10.4)
CHLORIDE SERPL-SCNC: 106 MMOL/L (ref 99–109)
CHOLEST SERPL-MCNC: 234 MG/DL (ref 0–200)
CO2 SERPL-SCNC: 27 MMOL/L (ref 20–31)
CREAT BLD-MCNC: 0.77 MG/DL (ref 0.6–1.3)
GFR SERPL CREATININE-BSD FRML MDRD: 77 ML/MIN/1.73
GLOBULIN UR ELPH-MCNC: 1.9 GM/DL
GLUCOSE BLD-MCNC: 83 MG/DL (ref 70–100)
HBA1C MFR BLD: 5.7 % (ref 4.8–5.6)
HDLC SERPL-MCNC: 78 MG/DL (ref 40–60)
POTASSIUM BLD-SCNC: 4 MMOL/L (ref 3.5–5.5)
PROT SERPL-MCNC: 6.4 G/DL (ref 5.7–8.2)
SODIUM BLD-SCNC: 140 MMOL/L (ref 132–146)
T4 FREE SERPL-MCNC: 1.04 NG/DL (ref 0.89–1.76)
TRIGL SERPL-MCNC: 60 MG/DL (ref 0–150)
TSH SERPL DL<=0.05 MIU/L-ACNC: 0.7 MIU/ML (ref 0.35–5.35)

## 2018-11-13 PROCEDURE — 84443 ASSAY THYROID STIM HORMONE: CPT | Performed by: INTERNAL MEDICINE

## 2018-11-13 PROCEDURE — 80061 LIPID PANEL: CPT | Performed by: INTERNAL MEDICINE

## 2018-11-13 PROCEDURE — 80053 COMPREHEN METABOLIC PANEL: CPT | Performed by: INTERNAL MEDICINE

## 2018-11-13 PROCEDURE — 84481 FREE ASSAY (FT-3): CPT | Performed by: INTERNAL MEDICINE

## 2018-11-13 PROCEDURE — 83036 HEMOGLOBIN GLYCOSYLATED A1C: CPT | Performed by: INTERNAL MEDICINE

## 2018-11-13 PROCEDURE — 84439 ASSAY OF FREE THYROXINE: CPT | Performed by: INTERNAL MEDICINE

## 2018-11-13 PROCEDURE — 99204 OFFICE O/P NEW MOD 45 MIN: CPT | Performed by: INTERNAL MEDICINE

## 2018-11-13 PROCEDURE — 36415 COLL VENOUS BLD VENIPUNCTURE: CPT | Performed by: INTERNAL MEDICINE

## 2018-11-13 RX ORDER — TRIAMCINOLONE ACETONIDE 0.25 MG/G
OINTMENT TOPICAL 3 TIMES DAILY
Qty: 15 G | Refills: 0 | Status: SHIPPED | OUTPATIENT
Start: 2018-11-13 | End: 2018-11-20

## 2018-11-13 RX ORDER — ONDANSETRON 4 MG/1
4 TABLET, ORALLY DISINTEGRATING ORAL EVERY 8 HOURS PRN
Qty: 20 TABLET | Refills: 0 | Status: SHIPPED | OUTPATIENT
Start: 2018-11-13

## 2018-11-13 RX ORDER — MECLIZINE HCL 25MG 25 MG/1
25 TABLET, CHEWABLE ORAL 3 TIMES DAILY PRN
Start: 2018-11-13 | End: 2018-11-13 | Stop reason: SDUPTHER

## 2018-11-13 RX ORDER — MECLIZINE HCL 25MG 25 MG/1
25 TABLET, CHEWABLE ORAL 3 TIMES DAILY PRN
Qty: 30 EACH | Refills: 1 | OUTPATIENT
Start: 2018-11-13 | End: 2020-01-02

## 2018-11-13 NOTE — ASSESSMENT & PLAN NOTE
No red flags like focal neuro changes. 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  -Discussed home Epley Maneuvers  -Rx Zofran and Meclizine PRN  -refer to ENT (Dr. Duke)  -f/u in 4-6 weeks; if still symptomatic would consider imaging  -advised to seek immediate medical care for persistent vertigo, vision changes, weakness/numbness or trouble speaking.

## 2018-11-13 NOTE — PATIENT INSTRUCTIONS
Discuss the Hep A, tetanus and shingles  Vaccines with your insurance/pharmacy.    Benign positional paroxysmal vertigo.    How to Perform the Epley Maneuver  The Epley maneuver is an exercise that relieves symptoms of vertigo. Vertigo is the feeling that you or your surroundings are moving when they are not. When you feel vertigo, you may feel like the room is spinning and have trouble walking. Dizziness is a little different than vertigo. When you are dizzy, you may feel unsteady or light-headed.  You can do this maneuver at home whenever you have symptoms of vertigo. You can do it up to 3 times a day until your symptoms go away.  Even though the Epley maneuver may relieve your vertigo for a few weeks, it is possible that your symptoms will return. This maneuver relieves vertigo, but it does not relieve dizziness.  What are the risks?  If it is done correctly, the Epley maneuver is considered safe. Sometimes it can lead to dizziness or nausea that goes away after a short time. If you develop other symptoms, such as changes in vision, weakness, or numbness, stop doing the maneuver and call your health care provider.  How to perform the Epley maneuver  1. Sit on the edge of a bed or table with your back straight and your legs extended or hanging over the edge of the bed or table.  2. Turn your head alf toward the affected ear or side.  3. Lie backward quickly with your head turned until you are lying flat on your back. You may want to position a pillow under your shoulders.  4. Hold this position for 30 seconds. You may experience an attack of vertigo. This is normal.  5. Turn your head to the opposite direction until your unaffected ear is facing the floor.  6. Hold this position for 30 seconds. You may experience an attack of vertigo. This is normal. Hold this position until the vertigo stops.  7. Turn your whole body to the same side as your head. Hold for another 30 seconds.  8. Sit back up.  You can repeat  this exercise up to 3 times a day.  Follow these instructions at home:  · After doing the Epley maneuver, you can return to your normal activities.  · Ask your health care provider if there is anything you should do at home to prevent vertigo. He or she may recommend that you:  ? Keep your head raised (elevated) with two or more pillows while you sleep.  ? Do not sleep on the side of your affected ear.  ? Get up slowly from bed.  ? Avoid sudden movements during the day.  ? Avoid extreme head movement, like looking up or bending over.  Contact a health care provider if:  · Your vertigo gets worse.  · You have other symptoms, including:  ? Nausea.  ? Vomiting.  ? Headache.  Get help right away if:  · You have vision changes.  · You have a severe or worsening headache or neck pain.  · You cannot stop vomiting.  · You have new numbness or weakness in any part of your body.  Summary  · Vertigo is the feeling that you or your surroundings are moving when they are not.  · The Epley maneuver is an exercise that relieves symptoms of vertigo.  · If the Epley maneuver is done correctly, it is considered safe. You can do it up to 3 times a day.  This information is not intended to replace advice given to you by your health care provider. Make sure you discuss any questions you have with your health care provider.  Document Released: 12/23/2014 Document Revised: 11/07/2017 Document Reviewed: 11/07/2017  Elsevier Interactive Patient Education © 2017 [employer]

## 2018-11-13 NOTE — ASSESSMENT & PLAN NOTE
Unclear if pt has hypothyroidism and/or need thyroid hormone replacement given reportedly normal labs with Sabi Alamo. Will check TSH, free T3 and free T4 today. Pt can consider stopping medication and repeating labs in 6 weeks. If abnormal would resume thyroid replacement therapy (but would use Synthroid).

## 2018-11-13 NOTE — ASSESSMENT & PLAN NOTE
Suspect atopic derm vs psoriasis.  -Rx Triamcinolone 0.025% TID x 7d  -if not improving consider derm referal

## 2018-11-15 LAB — T3FREE SERPL-MCNC: 3.3 PG/ML (ref 2–4.4)

## 2019-01-11 ENCOUNTER — TELEPHONE (OUTPATIENT)
Dept: INTERNAL MEDICINE | Facility: CLINIC | Age: 59
End: 2019-01-11

## 2019-01-11 NOTE — TELEPHONE ENCOUNTER
----- Message from Angle Vital sent at 1/11/2019  9:11 AM EST -----  JENNIFER WITH [facility] CALLED AND STATES PATIENT NEEDS A REFILL ON triamcinolone (KENALOG) 0.025 % ointment 90 DAY SUPPLY WITH UP TO 3 REFILLS. SHE CAN BE REACHED -[phone] REF# [id number]

## 2019-01-11 NOTE — TELEPHONE ENCOUNTER
Please call pt and see if her left leg rash is improved. I have only seen her once for this on 11/13 and not since. Ideally I would like her to be re-evaluated if still symptomatic as she may need a derm referal if not responding to the topical cream. Also missed 12/26 f/u.

## 2019-01-14 ENCOUNTER — TELEPHONE (OUTPATIENT)
Dept: INTERNAL MEDICINE | Facility: CLINIC | Age: 59
End: 2019-01-14

## 2019-01-14 NOTE — TELEPHONE ENCOUNTER
----- Message from Estefaina Perez sent at 1/14/2019  9:36 AM EST -----  Contact: [facility] CALLING FOR MYRNA ROBLES WHO NEEDS A REFILL FOR TRIAMCINOLONE OINTMENT.   [facility] PH: [phone]  REF# [id number]   FAX: [phone]

## 2019-01-14 NOTE — TELEPHONE ENCOUNTER
See phone call 1/11. Pt does not need refills of this and was going to notify [facility] of this. Can you please call [facility] to inform them no refills needed so they stop calling regarding this medication?

## 2019-06-04 ENCOUNTER — TRANSCRIBE ORDERS (OUTPATIENT)
Dept: ADMINISTRATIVE | Facility: HOSPITAL | Age: 59
End: 2019-06-04

## 2019-06-04 DIAGNOSIS — Z12.31 VISIT FOR SCREENING MAMMOGRAM: Primary | ICD-10-CM

## 2019-06-06 ENCOUNTER — HOSPITAL ENCOUNTER (OUTPATIENT)
Dept: MAMMOGRAPHY | Facility: HOSPITAL | Age: 59
Discharge: HOME | End: 2019-06-06
Admitting: NURSE PRACTITIONER

## 2019-06-06 DIAGNOSIS — Z12.31 VISIT FOR SCREENING MAMMOGRAM: ICD-10-CM

## 2019-06-06 PROCEDURE — 77067 SCR MAMMO BI INCL CAD: CPT | Performed by: RADIOLOGY

## 2019-06-06 PROCEDURE — 77063 BREAST TOMOSYNTHESIS BI: CPT | Performed by: RADIOLOGY

## 2019-06-06 PROCEDURE — 77063 BREAST TOMOSYNTHESIS BI: CPT

## 2019-06-06 PROCEDURE — 77067 SCR MAMMO BI INCL CAD: CPT

## 2019-11-07 ENCOUNTER — APPOINTMENT (OUTPATIENT)
Dept: GENERAL RADIOLOGY | Facility: HOSPITAL | Age: 59
End: 2019-11-07

## 2019-11-07 ENCOUNTER — HOSPITAL ENCOUNTER (EMERGENCY)
Facility: HOSPITAL | Age: 59
Discharge: HOME | End: 2019-11-08
Attending: EMERGENCY MEDICINE | Admitting: EMERGENCY MEDICINE

## 2019-11-07 ENCOUNTER — APPOINTMENT (OUTPATIENT)
Dept: MRI IMAGING | Facility: HOSPITAL | Age: 59
End: 2019-11-07

## 2019-11-07 DIAGNOSIS — R53.83 OTHER FATIGUE: Primary | ICD-10-CM

## 2019-11-07 DIAGNOSIS — Z86.69 HISTORY OF MENIERE'S DISEASE: ICD-10-CM

## 2019-11-07 DIAGNOSIS — H81.13 BENIGN PAROXYSMAL POSITIONAL VERTIGO DUE TO BILATERAL VESTIBULAR DISORDER: ICD-10-CM

## 2019-11-07 LAB
ALBUMIN SERPL-MCNC: 4 G/DL (ref 3.5–5.2)
ALBUMIN/GLOB SERPL: 1.5 G/DL
ALP SERPL-CCNC: 62 U/L (ref 39–117)
ALT SERPL W P-5'-P-CCNC: 54 U/L (ref 1–33)
ANION GAP SERPL CALCULATED.3IONS-SCNC: 14 MMOL/L (ref 5–15)
AST SERPL-CCNC: 47 U/L (ref 1–32)
BASOPHILS # BLD AUTO: 0.03 10*3/MM3 (ref 0–0.2)
BASOPHILS NFR BLD AUTO: 0.5 % (ref 0–1.5)
BILIRUB SERPL-MCNC: 0.4 MG/DL (ref 0.2–1.2)
BILIRUB UR QL STRIP: NEGATIVE
BUN BLD-MCNC: 12 MG/DL (ref 6–20)
BUN/CREAT SERPL: 15.2 (ref 7–25)
CALCIUM SPEC-SCNC: 8.9 MG/DL (ref 8.6–10.5)
CHLORIDE SERPL-SCNC: 103 MMOL/L (ref 98–107)
CK SERPL-CCNC: 39 U/L (ref 20–180)
CLARITY UR: CLEAR
CO2 SERPL-SCNC: 22 MMOL/L (ref 22–29)
COLOR UR: YELLOW
CREAT BLD-MCNC: 0.79 MG/DL (ref 0.57–1)
D-LACTATE SERPL-SCNC: 0.8 MMOL/L (ref 0.5–2)
DEPRECATED RDW RBC AUTO: 47.3 FL (ref 37–54)
EOSINOPHIL # BLD AUTO: 0.16 10*3/MM3 (ref 0–0.4)
EOSINOPHIL NFR BLD AUTO: 2.8 % (ref 0.3–6.2)
ERYTHROCYTE [DISTWIDTH] IN BLOOD BY AUTOMATED COUNT: 14.2 % (ref 12.3–15.4)
GFR SERPL CREATININE-BSD FRML MDRD: 74 ML/MIN/1.73
GLOBULIN UR ELPH-MCNC: 2.7 GM/DL
GLUCOSE BLD-MCNC: 82 MG/DL (ref 65–99)
GLUCOSE UR STRIP-MCNC: NEGATIVE MG/DL
HCT VFR BLD AUTO: 38.2 % (ref 34–46.6)
HGB BLD-MCNC: 12.9 G/DL (ref 12–15.9)
HGB UR QL STRIP.AUTO: NEGATIVE
HOLD SPECIMEN: NORMAL
HOLD SPECIMEN: NORMAL
IMM GRANULOCYTES # BLD AUTO: 0.02 10*3/MM3 (ref 0–0.05)
IMM GRANULOCYTES NFR BLD AUTO: 0.4 % (ref 0–0.5)
KETONES UR QL STRIP: ABNORMAL
LEUKOCYTE ESTERASE UR QL STRIP.AUTO: NEGATIVE
LYMPHOCYTES # BLD AUTO: 2.12 10*3/MM3 (ref 0.7–3.1)
LYMPHOCYTES NFR BLD AUTO: 37.3 % (ref 19.6–45.3)
MAGNESIUM SERPL-MCNC: 2 MG/DL (ref 1.6–2.6)
MCH RBC QN AUTO: 31 PG (ref 26.6–33)
MCHC RBC AUTO-ENTMCNC: 33.8 G/DL (ref 31.5–35.7)
MCV RBC AUTO: 91.8 FL (ref 79–97)
MONOCYTES # BLD AUTO: 0.51 10*3/MM3 (ref 0.1–0.9)
MONOCYTES NFR BLD AUTO: 9 % (ref 5–12)
NEUTROPHILS # BLD AUTO: 2.85 10*3/MM3 (ref 1.7–7)
NEUTROPHILS NFR BLD AUTO: 50 % (ref 42.7–76)
NITRITE UR QL STRIP: NEGATIVE
NRBC BLD AUTO-RTO: 0 /100 WBC (ref 0–0.2)
PH UR STRIP.AUTO: 6 [PH] (ref 5–8)
PLATELET # BLD AUTO: 239 10*3/MM3 (ref 140–450)
PMV BLD AUTO: 10.5 FL (ref 6–12)
POTASSIUM BLD-SCNC: 4 MMOL/L (ref 3.5–5.2)
PROT SERPL-MCNC: 6.7 G/DL (ref 6–8.5)
PROT UR QL STRIP: NEGATIVE
RBC # BLD AUTO: 4.16 10*6/MM3 (ref 3.77–5.28)
SODIUM BLD-SCNC: 139 MMOL/L (ref 136–145)
SP GR UR STRIP: 1.01 (ref 1–1.03)
TROPONIN T SERPL-MCNC: <0.01 NG/ML (ref 0–0.03)
UROBILINOGEN UR QL STRIP: ABNORMAL
WBC NRBC COR # BLD: 5.69 10*3/MM3 (ref 3.4–10.8)
WHOLE BLOOD HOLD SPECIMEN: NORMAL
WHOLE BLOOD HOLD SPECIMEN: NORMAL

## 2019-11-07 PROCEDURE — 80053 COMPREHEN METABOLIC PANEL: CPT | Performed by: EMERGENCY MEDICINE

## 2019-11-07 PROCEDURE — 71045 X-RAY EXAM CHEST 1 VIEW: CPT

## 2019-11-07 PROCEDURE — 83605 ASSAY OF LACTIC ACID: CPT | Performed by: NURSE PRACTITIONER

## 2019-11-07 PROCEDURE — 70551 MRI BRAIN STEM W/O DYE: CPT

## 2019-11-07 PROCEDURE — 93005 ELECTROCARDIOGRAM TRACING: CPT | Performed by: EMERGENCY MEDICINE

## 2019-11-07 PROCEDURE — 99284 EMERGENCY DEPT VISIT MOD MDM: CPT

## 2019-11-07 PROCEDURE — 83735 ASSAY OF MAGNESIUM: CPT | Performed by: EMERGENCY MEDICINE

## 2019-11-07 PROCEDURE — 82550 ASSAY OF CK (CPK): CPT | Performed by: NURSE PRACTITIONER

## 2019-11-07 PROCEDURE — 85025 COMPLETE CBC W/AUTO DIFF WBC: CPT | Performed by: EMERGENCY MEDICINE

## 2019-11-07 PROCEDURE — 81003 URINALYSIS AUTO W/O SCOPE: CPT | Performed by: EMERGENCY MEDICINE

## 2019-11-07 PROCEDURE — 93005 ELECTROCARDIOGRAM TRACING: CPT

## 2019-11-07 PROCEDURE — 84484 ASSAY OF TROPONIN QUANT: CPT | Performed by: EMERGENCY MEDICINE

## 2019-11-07 RX ORDER — SODIUM CHLORIDE 0.9 % (FLUSH) 0.9 %
10 SYRINGE (ML) INJECTION AS NEEDED
Status: DISCONTINUED | OUTPATIENT
Start: 2019-11-07 | End: 2019-11-08 | Stop reason: HOSPADM

## 2019-11-07 RX ADMIN — SODIUM CHLORIDE 1000 ML: 9 INJECTION, SOLUTION INTRAVENOUS at 20:33

## 2019-11-08 VITALS
SYSTOLIC BLOOD PRESSURE: 108 MMHG | TEMPERATURE: 98.7 F | BODY MASS INDEX: 33.13 KG/M2 | DIASTOLIC BLOOD PRESSURE: 57 MMHG | HEART RATE: 57 BPM | OXYGEN SATURATION: 99 % | RESPIRATION RATE: 16 BRPM | WEIGHT: 187 LBS | HEIGHT: 63 IN

## 2019-11-08 NOTE — DISCHARGE INSTRUCTIONS
Home to rest.  Follow up with Aileen Cotter to monitor your recovery.  Observe your reaction to elevating your carbohydrates and fluid status.  Thank you

## 2019-11-08 NOTE — ED PROVIDER NOTES
"Subjective   Billie Foster is a 59 year old female presenting to the ED today with complaints of weakness. She reports for the past couple of years she has been struggling with Meniere's Disease, and over the past 9 months it has worsened. She reports she is treated by Dr. Harris, ENT, for her disease. She reports her most recent treatment has been steroid shots in her eardrum, the most current was 2 days ago. Over the past 2 months she has been experiencing weakness. She describes her weakness as heaviness in her arms and legs with intermittent tingling in her fingers and feet. She states she has never had her weakness evaluated before because she's always associated it with her Meniere's disease. She states her \"weakness spells\" normally start with lightheadedness, near syncope, and extreme drowsiness, however after laying down and relaxing for awhile these symptoms improve. Today she went to work and came home because another \"weakness spell\" was happening, however rest was not relieving her symptoms so she came to the ED. Her sister states when she has these spells her face gets red and flushed. She also complains of dizziness, however she denies vomiting, abdominal pain, back pain, pallor, diaphoresis, shortness of breath, or dysuria. She states her current diet is very sodium restrictive. She states she drinks five 16oz bottles of water a day. She reports having a MRI 3 months ago which showed nothing abnormal. She denies being on any antibiotics in the past 12 months. She has never had a MI, DVT, PE, or stroke. There are no other acute complaints at this time.        History provided by:  Patient  Weakness - Generalized   Severity:  Severe  Onset quality:  Gradual  Timing:  Intermittent  Progression:  Worsening  Chronicity:  Recurrent  Ineffective treatments:  Lying down  Associated symptoms: difficulty walking, dizziness and near-syncope    Associated symptoms: no abdominal pain, no dysuria, no shortness of " breath and no vomiting        Review of Systems   Constitutional: Negative for diaphoresis.        Positive for drowsiness.    Respiratory: Negative for shortness of breath.    Cardiovascular: Positive for near-syncope.   Gastrointestinal: Negative for abdominal pain and vomiting.   Genitourinary: Negative for dysuria.   Musculoskeletal: Negative for back pain.   Skin: Negative for pallor.   Neurological: Positive for dizziness, weakness and light-headedness.        Positive for tingling in fingers and feet.   All other systems reviewed and are negative.      Past Medical History:   Diagnosis Date   • Anxiety    • Disease of thyroid gland    • Meniere disease, right        No Known Allergies    Past Surgical History:   Procedure Laterality Date   • ABDOMINOPLASTY  2018    TUMMY CRUZCK   •  SECTION      x2;  &    • CHOLECYSTECTOMY     • COLONOSCOPY  2017   • ENDOSCOPY  2017       Family History   Problem Relation Age of Onset   • Heart failure Mother    • Heart failure Father    • Colon cancer Maternal Aunt    • Colon cancer Cousin    • Breast cancer Neg Hx    • Ovarian cancer Neg Hx        Social History     Socioeconomic History   • Marital status:      Spouse name: Not on file   • Number of children: Not on file   • Years of education: Not on file   • Highest education level: Not on file   Tobacco Use   • Smoking status: Never Smoker   • Smokeless tobacco: Never Used   Substance and Sexual Activity   • Alcohol use: No   • Drug use: No   • Sexual activity: Defer         Objective   Physical Exam   Constitutional: She is oriented to person, place, and time. She appears well-developed and well-nourished. No distress.   Appears with a flat affect. She is non-toxic appearing and is an excellent historian.   HENT:   Head: Normocephalic and atraumatic.   Eyes: Conjunctivae are normal. No scleral icterus.   Neck: Normal range of motion. Neck supple.   Cardiovascular: Normal rate, regular  rhythm and normal heart sounds.   No murmur heard.  Pulmonary/Chest: Effort normal and breath sounds normal. No respiratory distress.   Abdominal: Soft. There is no tenderness.   Musculoskeletal: Normal range of motion.   Neurological: She is alert and oriented to person, place, and time.   Skin: Skin is warm and dry.   Psychiatric: She has a normal mood and affect. Her behavior is normal.   Nursing note and vitals reviewed.      Procedures         ED Course  ED Course as of Nov 07 2351   Thu Nov 07, 2019   3309 I have had a nice long conversation with the patient regarding her comprehensive findings today patient's work-up is very reassuring.  We discussed the possibilities of her symptoms being associated with ketogenic diet/ketosis.  Patient is very conscientious about avoiding sodium (due to Meniere's disease) while simultaneously pursuing very low carb diet.  This may very well explain her experience of muscle weakness in her limbs and fatigue.  She tends to vacillate in and out of dietary restrictions with carbohydrate from week to week.  She is going to follow-up with her primary provide.  We have discussed parameters for concern that would warrant return to the emergency department and she is thankful for her work-up today.  She admits to feeling subjectively stronger after IVF hydration tonight.   [MS]      ED Course User Index  [MS] Sheri Lyman, LUL     Recent Results (from the past 24 hour(s))   Comprehensive Metabolic Panel    Collection Time: 11/07/19  7:15 PM   Result Value Ref Range    Glucose 82 65 - 99 mg/dL    BUN 12 6 - 20 mg/dL    Creatinine 0.79 0.57 - 1.00 mg/dL    Sodium 139 136 - 145 mmol/L    Potassium 4.0 3.5 - 5.2 mmol/L    Chloride 103 98 - 107 mmol/L    CO2 22.0 22.0 - 29.0 mmol/L    Calcium 8.9 8.6 - 10.5 mg/dL    Total Protein 6.7 6.0 - 8.5 g/dL    Albumin 4.00 3.50 - 5.20 g/dL    ALT (SGPT) 54 (H) 1 - 33 U/L    AST (SGOT) 47 (H) 1 - 32 U/L    Alkaline Phosphatase 62 39 - 117  U/L    Total Bilirubin 0.4 0.2 - 1.2 mg/dL    eGFR Non African Amer 74 >60 mL/min/1.73    Globulin 2.7 gm/dL    A/G Ratio 1.5 g/dL    BUN/Creatinine Ratio 15.2 7.0 - 25.0    Anion Gap 14.0 5.0 - 15.0 mmol/L   Troponin    Collection Time: 11/07/19  7:15 PM   Result Value Ref Range    Troponin T <0.010 0.000 - 0.030 ng/mL   Magnesium    Collection Time: 11/07/19  7:15 PM   Result Value Ref Range    Magnesium 2.0 1.6 - 2.6 mg/dL   Light Blue Top    Collection Time: 11/07/19  7:15 PM   Result Value Ref Range    Extra Tube hold for add-on    Green Top (Gel)    Collection Time: 11/07/19  7:15 PM   Result Value Ref Range    Extra Tube Hold for add-ons.    Lavender Top    Collection Time: 11/07/19  7:15 PM   Result Value Ref Range    Extra Tube hold for add-on    Gold Top - SST    Collection Time: 11/07/19  7:15 PM   Result Value Ref Range    Extra Tube Hold for add-ons.    CBC Auto Differential    Collection Time: 11/07/19  7:15 PM   Result Value Ref Range    WBC 5.69 3.40 - 10.80 10*3/mm3    RBC 4.16 3.77 - 5.28 10*6/mm3    Hemoglobin 12.9 12.0 - 15.9 g/dL    Hematocrit 38.2 34.0 - 46.6 %    MCV 91.8 79.0 - 97.0 fL    MCH 31.0 26.6 - 33.0 pg    MCHC 33.8 31.5 - 35.7 g/dL    RDW 14.2 12.3 - 15.4 %    RDW-SD 47.3 37.0 - 54.0 fl    MPV 10.5 6.0 - 12.0 fL    Platelets 239 140 - 450 10*3/mm3    Neutrophil % 50.0 42.7 - 76.0 %    Lymphocyte % 37.3 19.6 - 45.3 %    Monocyte % 9.0 5.0 - 12.0 %    Eosinophil % 2.8 0.3 - 6.2 %    Basophil % 0.5 0.0 - 1.5 %    Immature Grans % 0.4 0.0 - 0.5 %    Neutrophils, Absolute 2.85 1.70 - 7.00 10*3/mm3    Lymphocytes, Absolute 2.12 0.70 - 3.10 10*3/mm3    Monocytes, Absolute 0.51 0.10 - 0.90 10*3/mm3    Eosinophils, Absolute 0.16 0.00 - 0.40 10*3/mm3    Basophils, Absolute 0.03 0.00 - 0.20 10*3/mm3    Immature Grans, Absolute 0.02 0.00 - 0.05 10*3/mm3    nRBC 0.0 0.0 - 0.2 /100 WBC   CK    Collection Time: 11/07/19  7:15 PM   Result Value Ref Range    Creatine Kinase 39 20 - 180 U/L    Urinalysis With Microscopic If Indicated (No Culture) - Urine, Clean Catch    Collection Time: 11/07/19  7:26 PM   Result Value Ref Range    Color, UA Yellow Yellow, Straw    Appearance, UA Clear Clear    pH, UA 6.0 5.0 - 8.0    Specific Gravity, UA 1.009 1.001 - 1.030    Glucose, UA Negative Negative    Ketones, UA 15 mg/dL (1+) (A) Negative    Bilirubin, UA Negative Negative    Blood, UA Negative Negative    Protein, UA Negative Negative    Leuk Esterase, UA Negative Negative    Nitrite, UA Negative Negative    Urobilinogen, UA 0.2 E.U./dL 0.2 - 1.0 E.U./dL   Lactic Acid, Plasma    Collection Time: 11/07/19  8:28 PM   Result Value Ref Range    Lactate 0.8 0.5 - 2.0 mmol/L     Note: In addition to lab results from this visit, the labs listed above may include labs taken at another facility or during a different encounter within the last 24 hours. Please correlate lab times with ED admission and discharge times for further clarification of the services performed during this visit.    MRI Brain Without Contrast   Final Result      XR Chest 1 View   Final Result   No acute cardiopulmonary findings.      Signer Name: Ophelia Nguyen MD    Signed: 11/7/2019 8:47 PM    Workstation Name: [email or web address]     [facility]        Vitals:    11/07/19 2000 11/07/19 2100 11/07/19 2230 11/07/19 2302   BP: 134/67 125/60 117/63 110/57   BP Location:       Patient Position:       Pulse: 67 63     Resp:       Temp:       TempSrc:       SpO2: 100% 97%     Weight:       Height:         Medications   sodium chloride 0.9 % flush 10 mL (not administered)   sodium chloride 0.9 % bolus 1,000 mL (0 mL Intravenous Stopped 11/7/19 2309)     ECG/EMG Results (last 24 hours)     Procedure Component Value Units Date/Time    ECG 12 Lead [[id number]] Collected:  11/07/19 1908     Updated:  11/07/19 1909        ECG 12 Lead                             MDM    Final diagnoses:   Other fatigue   History of Meniere's disease    Benign paroxysmal positional vertigo due to bilateral vestibular disorder       Documentation assistance provided by jonnathan Bennett.  Information recorded by the jonnathan was done at my direction and has been verified and validated by me.     Alejandra Bennett  11/07/19 2040       Sheri Lyman, LUL  11/07/19 9203

## 2020-01-02 ENCOUNTER — APPOINTMENT (OUTPATIENT)
Dept: CT IMAGING | Facility: HOSPITAL | Age: 60
End: 2020-01-02

## 2020-01-02 ENCOUNTER — HOSPITAL ENCOUNTER (EMERGENCY)
Facility: HOSPITAL | Age: 60
Discharge: HOME | End: 2020-01-02
Attending: EMERGENCY MEDICINE | Admitting: EMERGENCY MEDICINE

## 2020-01-02 VITALS
RESPIRATION RATE: 14 BRPM | DIASTOLIC BLOOD PRESSURE: 65 MMHG | HEIGHT: 63 IN | OXYGEN SATURATION: 95 % | HEART RATE: 60 BPM | SYSTOLIC BLOOD PRESSURE: 123 MMHG | WEIGHT: 185 LBS | BODY MASS INDEX: 32.78 KG/M2 | TEMPERATURE: 98 F

## 2020-01-02 DIAGNOSIS — R42 DIZZINESS: Primary | ICD-10-CM

## 2020-01-02 DIAGNOSIS — R53.1 WEAKNESS: ICD-10-CM

## 2020-01-02 DIAGNOSIS — G90.8 DYSAUTONOMIA-LIKE DISORDER: ICD-10-CM

## 2020-01-02 DIAGNOSIS — R51.9 ACUTE NONINTRACTABLE HEADACHE, UNSPECIFIED HEADACHE TYPE: ICD-10-CM

## 2020-01-02 LAB
ALBUMIN SERPL-MCNC: 4.4 G/DL (ref 3.5–5.2)
ALBUMIN/GLOB SERPL: 1.8 G/DL
ALP SERPL-CCNC: 65 U/L (ref 39–117)
ALT SERPL W P-5'-P-CCNC: 21 U/L (ref 1–33)
ANION GAP SERPL CALCULATED.3IONS-SCNC: 15 MMOL/L (ref 5–15)
AST SERPL-CCNC: 23 U/L (ref 1–32)
BASOPHILS # BLD AUTO: 0.03 10*3/MM3 (ref 0–0.2)
BASOPHILS NFR BLD AUTO: 0.6 % (ref 0–1.5)
BILIRUB SERPL-MCNC: 0.6 MG/DL (ref 0.2–1.2)
BUN BLD-MCNC: 17 MG/DL (ref 6–20)
BUN/CREAT SERPL: 21.5 (ref 7–25)
CALCIUM SPEC-SCNC: 9.7 MG/DL (ref 8.6–10.5)
CHLORIDE SERPL-SCNC: 99 MMOL/L (ref 98–107)
CO2 SERPL-SCNC: 23 MMOL/L (ref 22–29)
CREAT BLD-MCNC: 0.79 MG/DL (ref 0.57–1)
DEPRECATED RDW RBC AUTO: 41.5 FL (ref 37–54)
EOSINOPHIL # BLD AUTO: 0.08 10*3/MM3 (ref 0–0.4)
EOSINOPHIL NFR BLD AUTO: 1.6 % (ref 0.3–6.2)
ERYTHROCYTE [DISTWIDTH] IN BLOOD BY AUTOMATED COUNT: 12.8 % (ref 12.3–15.4)
GFR SERPL CREATININE-BSD FRML MDRD: 74 ML/MIN/1.73
GLOBULIN UR ELPH-MCNC: 2.5 GM/DL
GLUCOSE BLD-MCNC: 95 MG/DL (ref 65–99)
HCT VFR BLD AUTO: 41.6 % (ref 34–46.6)
HGB BLD-MCNC: 14.4 G/DL (ref 12–15.9)
HOLD SPECIMEN: NORMAL
HOLD SPECIMEN: NORMAL
IMM GRANULOCYTES # BLD AUTO: 0.01 10*3/MM3 (ref 0–0.05)
IMM GRANULOCYTES NFR BLD AUTO: 0.2 % (ref 0–0.5)
LYMPHOCYTES # BLD AUTO: 1.21 10*3/MM3 (ref 0.7–3.1)
LYMPHOCYTES NFR BLD AUTO: 24.4 % (ref 19.6–45.3)
MCH RBC QN AUTO: 30.7 PG (ref 26.6–33)
MCHC RBC AUTO-ENTMCNC: 34.6 G/DL (ref 31.5–35.7)
MCV RBC AUTO: 88.7 FL (ref 79–97)
MONOCYTES # BLD AUTO: 0.46 10*3/MM3 (ref 0.1–0.9)
MONOCYTES NFR BLD AUTO: 9.3 % (ref 5–12)
NEUTROPHILS # BLD AUTO: 3.16 10*3/MM3 (ref 1.7–7)
NEUTROPHILS NFR BLD AUTO: 63.9 % (ref 42.7–76)
NRBC BLD AUTO-RTO: 0 /100 WBC (ref 0–0.2)
PLATELET # BLD AUTO: 217 10*3/MM3 (ref 140–450)
PMV BLD AUTO: 10.9 FL (ref 6–12)
POTASSIUM BLD-SCNC: 3.5 MMOL/L (ref 3.5–5.2)
PROT SERPL-MCNC: 6.9 G/DL (ref 6–8.5)
RBC # BLD AUTO: 4.69 10*6/MM3 (ref 3.77–5.28)
SODIUM BLD-SCNC: 137 MMOL/L (ref 136–145)
TROPONIN T SERPL-MCNC: <0.01 NG/ML (ref 0–0.03)
WBC NRBC COR # BLD: 4.95 10*3/MM3 (ref 3.4–10.8)
WHOLE BLOOD HOLD SPECIMEN: NORMAL
WHOLE BLOOD HOLD SPECIMEN: NORMAL

## 2020-01-02 PROCEDURE — 99283 EMERGENCY DEPT VISIT LOW MDM: CPT

## 2020-01-02 PROCEDURE — 25010000002 ONDANSETRON PER 1 MG: Performed by: EMERGENCY MEDICINE

## 2020-01-02 PROCEDURE — 96375 TX/PRO/DX INJ NEW DRUG ADDON: CPT

## 2020-01-02 PROCEDURE — 96374 THER/PROPH/DIAG INJ IV PUSH: CPT

## 2020-01-02 PROCEDURE — 25010000002 DIMENHYDRINATE 50 MG/ML SOLUTION: Performed by: EMERGENCY MEDICINE

## 2020-01-02 PROCEDURE — 84484 ASSAY OF TROPONIN QUANT: CPT | Performed by: EMERGENCY MEDICINE

## 2020-01-02 PROCEDURE — 70450 CT HEAD/BRAIN W/O DYE: CPT

## 2020-01-02 PROCEDURE — 93005 ELECTROCARDIOGRAM TRACING: CPT | Performed by: EMERGENCY MEDICINE

## 2020-01-02 PROCEDURE — 80053 COMPREHEN METABOLIC PANEL: CPT | Performed by: EMERGENCY MEDICINE

## 2020-01-02 PROCEDURE — 85025 COMPLETE CBC W/AUTO DIFF WBC: CPT | Performed by: EMERGENCY MEDICINE

## 2020-01-02 PROCEDURE — 25010000002 DEXAMETHASONE PER 1 MG: Performed by: EMERGENCY MEDICINE

## 2020-01-02 RX ORDER — ONDANSETRON 2 MG/ML
4 INJECTION INTRAMUSCULAR; INTRAVENOUS ONCE
Status: COMPLETED | OUTPATIENT
Start: 2020-01-02 | End: 2020-01-02

## 2020-01-02 RX ORDER — PREDNISONE 20 MG/1
20 TABLET ORAL 2 TIMES DAILY
Qty: 6 TABLET | Refills: 0 | Status: SHIPPED | OUTPATIENT
Start: 2020-01-02 | End: 2020-01-05

## 2020-01-02 RX ORDER — DIMENHYDRINATE 50 MG/1
50 TABLET ORAL 4 TIMES DAILY PRN
Qty: 30 TABLET | Refills: 0 | Status: SHIPPED | OUTPATIENT
Start: 2020-01-02

## 2020-01-02 RX ORDER — SODIUM CHLORIDE 0.9 % (FLUSH) 0.9 %
10 SYRINGE (ML) INJECTION AS NEEDED
Status: DISCONTINUED | OUTPATIENT
Start: 2020-01-02 | End: 2020-01-02 | Stop reason: HOSPADM

## 2020-01-02 RX ORDER — DIMENHYDRINATE 50 MG/ML
50 INJECTION, SOLUTION INTRAMUSCULAR; INTRAVENOUS ONCE
Status: COMPLETED | OUTPATIENT
Start: 2020-01-02 | End: 2020-01-02

## 2020-01-02 RX ORDER — DEXAMETHASONE SODIUM PHOSPHATE 4 MG/ML
8 INJECTION, SOLUTION INTRA-ARTICULAR; INTRALESIONAL; INTRAMUSCULAR; INTRAVENOUS; SOFT TISSUE ONCE
Status: COMPLETED | OUTPATIENT
Start: 2020-01-02 | End: 2020-01-02

## 2020-01-02 RX ADMIN — ONDANSETRON 4 MG: 2 INJECTION INTRAMUSCULAR; INTRAVENOUS at 08:15

## 2020-01-02 RX ADMIN — DEXAMETHASONE SODIUM PHOSPHATE 8 MG: 4 INJECTION, SOLUTION INTRAMUSCULAR; INTRAVENOUS at 08:20

## 2020-01-02 RX ADMIN — SODIUM CHLORIDE, POTASSIUM CHLORIDE, SODIUM LACTATE AND CALCIUM CHLORIDE 500 ML: 600; 310; 30; 20 INJECTION, SOLUTION INTRAVENOUS at 08:14

## 2020-01-02 RX ADMIN — DIMENHYDRINATE 50 MG: 50 INJECTION, SOLUTION INTRAMUSCULAR; INTRAVENOUS at 08:15

## 2020-01-02 NOTE — DISCHARGE INSTRUCTIONS
Avoid driving or operating equipment while dizzy.  He want a second opinion I would suggest a center of excellence at [facility] or the [facility]

## 2020-01-02 NOTE — ED PROVIDER NOTES
"Subjective   Billie Foster is a 59 year old female complaining of generalized weakness. The patient has a medical history of Meniere's Disease with onset in January 2017. She states that she went to the [facility] at that time, but that the physicians there were unable to find an effective treatment for her symptoms. She has been seeing Dr. Harris at  clinic for her Meniere's Disease and has received three ineffective steroid shots in her eardrum with her last one occurring on December 4, 2019. She notes that she has also tried Meclizine, Dyazide, and Valium, though she states that these medications seem to make her symptoms worse and that she has stopped taking them. The patient reports that last night around 2000, she began to feel extremely weak, dizzy, lightheaded, and nauseous. She also notes a headache that feels as if it is \"burning\" as well as muscular twitching and shaking. She denies any known triggers for her symptoms. She believed at the time that her symptoms may be due to a flare of her Meniere's Disease and decided to sleep in an effort to reduce her discomfort, though she states that she woke up this morning feeling significantly worse and decided to come to the ED for evaluation. She currently denies any rhinorrhea, sore throat, or cough. The patient notes that her current symptoms do not feel similar to her flares of Meniere's Disease in the past. However, she does state that she has been experiencing intermittent abdominal cramping since her Meniere's Disease began and that her nausea does remind her somewhat of these episodes, though she is not presently vomiting as she has in the past. She has received a normal endoscopy and colonoscopy with Dr. Dow as well as normal exams with her allergist, Dr. Schultz. Her gastrointestinal symptoms are presently unexplained, though she states that she has experienced some relief from her abdominal cramping with a low sodium diet. The patient reports " "that she began receiving physical therapy two weeks ago. She also notes that she is often unable to drive due to chronic imbalance and also experiences a chronic \"roaring in her ear\" due to her Meniere's Disease. There are no other acute complaints at this time.      History provided by:  Patient  Weakness - Generalized   Severity:  Moderate  Onset quality:  Sudden  Duration:  12 hours  Timing:  Constant  Progression:  Worsening  Chronicity:  New  Relieved by:  Nothing  Ineffective treatments:  Lying down and sleep  Associated symptoms: abdominal pain, dizziness, headaches and nausea    Associated symptoms: no cough and no vomiting        Review of Systems   HENT: Negative for rhinorrhea and sore throat.         Chronic \"roaring in the ear.\"   Respiratory: Negative for cough.    Gastrointestinal: Positive for abdominal pain and nausea. Negative for vomiting.   Neurological: Positive for dizziness, weakness, light-headedness and headaches.        Muscle twitching and shaking. Chronic imbalance from Meniere's Disease.   All other systems reviewed and are negative.      Past Medical History:   Diagnosis Date   • Anxiety    • Disease of thyroid gland    • Meniere disease    • Meniere disease, right    • Vertigo        No Known Allergies    Past Surgical History:   Procedure Laterality Date   • ABDOMINOPLASTY  2018    RADHA ABDALLA   •  SECTION      x2;  &    • CHOLECYSTECTOMY     • COLONOSCOPY  2017   • ENDOSCOPY  2017       Family History   Problem Relation Age of Onset   • Heart failure Mother    • Heart failure Father    • Colon cancer Maternal Aunt    • Colon cancer Cousin    • Breast cancer Neg Hx    • Ovarian cancer Neg Hx        Social History     Socioeconomic History   • Marital status:      Spouse name: Not on file   • Number of children: Not on file   • Years of education: Not on file   • Highest education level: Not on file   Tobacco Use   • Smoking status: Never Smoker   • " Smokeless tobacco: Never Used   Substance and Sexual Activity   • Alcohol use: No   • Drug use: No   • Sexual activity: Defer         Objective   Physical Exam   Constitutional: She is oriented to person, place, and time. She appears well-developed and well-nourished. No distress.   Alert and oriented. Mentally intact.   HENT:   Head: Normocephalic and atraumatic.   Left Ear: Tympanic membrane normal.   Nose: Nose normal.   Mouth/Throat: Oropharynx is clear and moist and mucous membranes are normal. Mucous membranes are not pale, not dry and not cyanotic.   Nasopharynx and oropharynx are normal. Mucous membranes are pink and moist. Right TM is slightly cloudy but does not appear infected.   Eyes: Pupils are equal, round, and reactive to light. Conjunctivae and EOM are normal. No scleral icterus.   Neck: Normal range of motion. Neck supple. No JVD present. Carotid bruit is not present.   Cardiovascular: Normal rate, regular rhythm and normal pulses. Exam reveals no gallop and no friction rub.   No murmur heard.  Equal pulses.   Pulmonary/Chest: Effort normal and breath sounds normal. No respiratory distress.   Lungs clear to auscultation bilaterally. No increased work of breathing.   Abdominal: Soft. She exhibits no mass. There is no hepatosplenomegaly, splenomegaly or hepatomegaly. There is no tenderness. There is no guarding.   No organomegaly. Mildly obese.   Musculoskeletal: Normal range of motion. She exhibits no edema.   Extremities normal.   Lymphadenopathy:     She has no cervical adenopathy.   Neurological: She is alert and oriented to person, place, and time. No sensory deficit.   Normal facial symmetry. Voice strong. Tongue midline. Vision, hearing, and speech intact. Normal sensory function. 1-2 beats right lateral gaze nystagmus. Mild generalized weakness.   Skin: Skin is warm and dry.   No tissue loss.   Psychiatric: She has a normal mood and affect. Her behavior is normal.   Nursing note and vitals  reviewed.      Procedures         ED Course  ED Course as of Jan 02 1602   Thu Jan 02, 2020   0752 Reviewed Dr. Harris's note from September. Patient has right-sided Meniere's disease and received intertympanic steroid injections. Most recently seen on December 10th.--ER    [RF]      ED Course User Index  [RF] Alejandra Lawrence     Recent Results (from the past 24 hour(s))   Light Blue Top    Collection Time: 01/02/20  7:37 AM   Result Value Ref Range    Extra Tube hold for add-on    Green Top (Gel)    Collection Time: 01/02/20  7:37 AM   Result Value Ref Range    Extra Tube Hold for add-ons.    Lavender Top    Collection Time: 01/02/20  7:37 AM   Result Value Ref Range    Extra Tube hold for add-on    Gold Top - SST    Collection Time: 01/02/20  7:37 AM   Result Value Ref Range    Extra Tube Hold for add-ons.    Comprehensive Metabolic Panel    Collection Time: 01/02/20  7:37 AM   Result Value Ref Range    Glucose 95 65 - 99 mg/dL    BUN 17 6 - 20 mg/dL    Creatinine 0.79 0.57 - 1.00 mg/dL    Sodium 137 136 - 145 mmol/L    Potassium 3.5 3.5 - 5.2 mmol/L    Chloride 99 98 - 107 mmol/L    CO2 23.0 22.0 - 29.0 mmol/L    Calcium 9.7 8.6 - 10.5 mg/dL    Total Protein 6.9 6.0 - 8.5 g/dL    Albumin 4.40 3.50 - 5.20 g/dL    ALT (SGPT) 21 1 - 33 U/L    AST (SGOT) 23 1 - 32 U/L    Alkaline Phosphatase 65 39 - 117 U/L    Total Bilirubin 0.6 0.2 - 1.2 mg/dL    eGFR Non African Amer 74 >60 mL/min/1.73    Globulin 2.5 gm/dL    A/G Ratio 1.8 g/dL    BUN/Creatinine Ratio 21.5 7.0 - 25.0    Anion Gap 15.0 5.0 - 15.0 mmol/L   Troponin    Collection Time: 01/02/20  7:37 AM   Result Value Ref Range    Troponin T <0.010 0.000 - 0.030 ng/mL   CBC Auto Differential    Collection Time: 01/02/20  7:37 AM   Result Value Ref Range    WBC 4.95 3.40 - 10.80 10*3/mm3    RBC 4.69 3.77 - 5.28 10*6/mm3    Hemoglobin 14.4 12.0 - 15.9 g/dL    Hematocrit 41.6 34.0 - 46.6 %    MCV 88.7 79.0 - 97.0 fL    MCH 30.7 26.6 - 33.0 pg    MCHC 34.6 31.5 - 35.7  "g/dL    RDW 12.8 12.3 - 15.4 %    RDW-SD 41.5 37.0 - 54.0 fl    MPV 10.9 6.0 - 12.0 fL    Platelets 217 140 - 450 10*3/mm3    Neutrophil % 63.9 42.7 - 76.0 %    Lymphocyte % 24.4 19.6 - 45.3 %    Monocyte % 9.3 5.0 - 12.0 %    Eosinophil % 1.6 0.3 - 6.2 %    Basophil % 0.6 0.0 - 1.5 %    Immature Grans % 0.2 0.0 - 0.5 %    Neutrophils, Absolute 3.16 1.70 - 7.00 10*3/mm3    Lymphocytes, Absolute 1.21 0.70 - 3.10 10*3/mm3    Monocytes, Absolute 0.46 0.10 - 0.90 10*3/mm3    Eosinophils, Absolute 0.08 0.00 - 0.40 10*3/mm3    Basophils, Absolute 0.03 0.00 - 0.20 10*3/mm3    Immature Grans, Absolute 0.01 0.00 - 0.05 10*3/mm3    nRBC 0.0 0.0 - 0.2 /100 WBC     Note: In addition to lab results from this visit, the labs listed above may include labs taken at another facility or during a different encounter within the last 24 hours. Please correlate lab times with ED admission and discharge times for further clarification of the services performed during this visit.    CT Head Without Contrast   Final Result   No acute intracranial abnormality.       D:  01/02/2020   E:  01/02/2020       This report was finalized on 1/2/2020 10:54 AM by Dr. Kevan Esparza.            Vitals:    01/02/20 0732 01/02/20 0845   BP: 128/70 123/65   Patient Position: Lying    Pulse: 81 60   Resp: 14    Temp: 98 °F (36.7 °C)    TempSrc: Oral    SpO2: 98% 95%   Weight: 83.9 kg (185 lb)    Height: 160 cm (63\")      Medications   lactated ringers bolus 500 mL (0 mL Intravenous Stopped 1/2/20 0927)   ondansetron (ZOFRAN) injection 4 mg (4 mg Intravenous Given 1/2/20 0815)   dimenhyDRINATE injection 50 mg (50 mg Intravenous Given 1/2/20 0815)   dexamethasone (DECADRON) injection 8 mg (8 mg Intravenous Given 1/2/20 0820)     ECG/EMG Results (last 24 hours)     ** No results found for the last 24 hours. **        ECG 12 Lead   Final Result   Test Reason : DIZZY   Blood Pressure : **/** mmHG   Vent. Rate : 059 BPM     Atrial Rate : 059 BPM      P-R Int : 154 " ms          QRS Dur : 084 ms       QT Int : 444 ms       P-R-T Axes : 014 012 038 degrees      QTc Int : 439 ms      Sinus bradycardia   Otherwise normal ECG   When compared with ECG of 07-NOV-2019 19:08,   No significant change was found   Confirmed by JEROME NICHOLAS MD ([id number]) on 1/2/2020 7:43:18 AM      Referred By:  MAT ZIEGLER           Confirmed By:JEROME NICHOLAS MD                        MDM  Number of Diagnoses or Management Options  Acute nonintractable headache, unspecified headache type:   Dizziness:   Dysautonomia-like disorder:   Weakness:   Diagnosis management comments:       I reviewed all available studies at bedside with the patient and her sister.  Her CT scan and labs are reassuring.  This lady has had many chronic problems including this dizziness thought to represent Ménière's disease.  She also has recurrent abdominal issues.  I suspect she may have some degree of dysautonomia.    In the ER she received IV fluids along with Decadron and Dramamine and felt marginally better.  Her exam remained unchanged.    At this point we may try a short course of oral steroids along with Dramamine instead of Meclizine to see if this gives her relief of her symptoms.  She has a follow-up later this month with ENT at  and I have encouraged her to keep that appointment. I have asked her to call her primary care doctor for follow-up as well.  Should she want a second opinion, I thought she might be best served by going to a larger tertiary care university like [facility] or [place].    She will return to the ER if worse in any way.  Unfortunately, I think long-term it will be very difficult to control her symptoms.    All are agreeable with plan.       Amount and/or Complexity of Data Reviewed  Clinical lab tests: reviewed  Tests in the radiology section of CPT®: reviewed        Final diagnoses:   Dizziness   Acute nonintractable headache, unspecified headache type   Weakness   Dysautonomia-like disorder        Documentation assistance provided by jonnatahn Lawrence.  Information recorded by the jonnathan was done at my direction and has been verified and validated by me.     Alejandra Lawrence  01/02/20 0852       Alejandra Lawrence  01/02/20 0852       Alejandra Lawrence  01/02/20 0953       Nabil Worthy MD  01/02/20 5309

## 2020-08-31 ENCOUNTER — TRANSCRIBE ORDERS (OUTPATIENT)
Dept: ADMINISTRATIVE | Facility: HOSPITAL | Age: 60
End: 2020-08-31

## 2020-08-31 DIAGNOSIS — Z12.31 VISIT FOR SCREENING MAMMOGRAM: Primary | ICD-10-CM

## 2020-10-12 ENCOUNTER — HOSPITAL ENCOUNTER (OUTPATIENT)
Dept: MAMMOGRAPHY | Facility: HOSPITAL | Age: 60
Discharge: HOME | End: 2020-10-12
Admitting: NURSE PRACTITIONER

## 2020-10-12 DIAGNOSIS — Z12.31 VISIT FOR SCREENING MAMMOGRAM: ICD-10-CM

## 2020-10-12 PROCEDURE — 77063 BREAST TOMOSYNTHESIS BI: CPT

## 2020-10-12 PROCEDURE — 77067 SCR MAMMO BI INCL CAD: CPT | Performed by: RADIOLOGY

## 2020-10-12 PROCEDURE — 77067 SCR MAMMO BI INCL CAD: CPT

## 2020-10-12 PROCEDURE — 77063 BREAST TOMOSYNTHESIS BI: CPT | Performed by: RADIOLOGY

## 2021-03-09 ENCOUNTER — IMMUNIZATION (OUTPATIENT)
Dept: VACCINE CLINIC | Facility: HOSPITAL | Age: 61
End: 2021-03-09

## 2021-03-09 PROCEDURE — 91300 HC SARSCOV02 VAC 30MCG/0.3ML IM: CPT | Performed by: INTERNAL MEDICINE

## 2021-03-09 PROCEDURE — 0001A: CPT | Performed by: INTERNAL MEDICINE

## 2021-03-30 ENCOUNTER — IMMUNIZATION (OUTPATIENT)
Dept: VACCINE CLINIC | Facility: HOSPITAL | Age: 61
End: 2021-03-30

## 2021-03-30 PROCEDURE — 0002A: CPT | Performed by: INTERNAL MEDICINE

## 2021-03-30 PROCEDURE — 91300 HC SARSCOV02 VAC 30MCG/0.3ML IM: CPT | Performed by: INTERNAL MEDICINE

## 2021-11-29 ENCOUNTER — OFFICE VISIT (OUTPATIENT)
Dept: INTERNAL MEDICINE | Facility: CLINIC | Age: 61
End: 2021-11-29

## 2021-11-29 VITALS
RESPIRATION RATE: 18 BRPM | HEIGHT: 61 IN | HEART RATE: 75 BPM | TEMPERATURE: 98 F | SYSTOLIC BLOOD PRESSURE: 124 MMHG | DIASTOLIC BLOOD PRESSURE: 78 MMHG | BODY MASS INDEX: 37.46 KG/M2 | WEIGHT: 198.4 LBS | OXYGEN SATURATION: 99 %

## 2021-11-29 DIAGNOSIS — E03.9 HYPOTHYROIDISM, UNSPECIFIED TYPE: Primary | ICD-10-CM

## 2021-11-29 DIAGNOSIS — K21.9 GASTROESOPHAGEAL REFLUX DISEASE WITHOUT ESOPHAGITIS: ICD-10-CM

## 2021-11-29 PROCEDURE — 99203 OFFICE O/P NEW LOW 30 MIN: CPT | Performed by: INTERNAL MEDICINE

## 2021-11-29 RX ORDER — AMITRIPTYLINE HYDROCHLORIDE 10 MG/1
10 TABLET, FILM COATED ORAL NIGHTLY
COMMUNITY

## 2021-11-30 NOTE — PROGRESS NOTES
Chief Complaint   Patient presents with   • Establish Care     THYROID ISSUES        History of Present Illness    The patient presents for a new patient visit for hypothyroidism. She reports that she does not have as much energy as usual. She reports constipation but denies hair loss, heat intolerance, cold intolerance, diarrhea, sweats or palpitations. She is not on thyroid medication. She has been on multiple thyroid medications and hasn't tolerated any of them. She has tried Deming-thyroid, Naturethroid, synthroid. She is currently using a thyroid health supplement. A thyroid ultrasound has been performed and this was normal. Thyroid antibodies have previously been obtained (on multiple occassions) and are normal.    The patient presents for a new patient visit for GERD. The patient is not on medication for her gastroesophageal reflux. She reports no abdominal pain, belching, chest pain, dysphagia, early satiety, heartburn, hoarseness, nausea, odynophagia, rectal bleeding, vomiting or weight loss. The GERD has no known aggravating factors.    Review of Systems    CONSTITUTIONAL- Denies Fever, Chills, Sweats, Weakness or Malaise.    NECK- Denies Decreased Range of Motion, Stiffness, Thyroid Nodules, Enlarged Lymph Nodes or Goiter.    EYES- Denies Eye Pain, Eye Drainage, Eye Redness, Eye Discharge, Decreased Vision, Visual Disturbance, Diplopia, Visual Loss or Swollen Eyelid.    HENT- Denies Nasal Discharge, Sore Throat, Ear Pain, Ear Drainage, Headache, Sinus Pain, Nasal Congestion, Decreased Hearing or Tinnitus.    PULMONARY- Denies Wheezing, Dyspnea, Sputum Production, Cough, Hemoptysis or Pleuritic Chest Pain.    CARDIOVASCULAR- Denies Claudication, Edema, Syncope or Irregular Heart Beat.    GENITOURINARY- Denies Dysuria, Hematuria, Urinary Frequency, Urinary Leakage, Pelvic Pain, Vaginal Prolapse, Vaginal Discharge, Dyspareunia or Abnormal Menses.    ENDOCRINE- Denies Depression, Memory Loss, Polydypsia,  Polyphagia, Polyuria, Sleep Disturbance or Weight Gain.    NEUROLOGIC- Denies Seizures, Visual Changes, Confusion or Excessive Daytime Sleepiness.    MUSCULOSKELETAL- Denies Joint Pain, Joint Stiffness, Decreased Range of Motion, Joint Swelling or Erythema of Joints.    INTEGUMENTARY- Denies Rash, Lumps, Sores, Itching, Dryness, Color Change, Changes in Hair, Brittle Nails, Discolored Nails, Thickened Nails, Growths or Alopecia.    Medications      Current Outpatient Medications:   •  amitriptyline (ELAVIL) 10 MG tablet, Take 10 mg by mouth Every Night., Disp: , Rfl:   •  dimenhyDRINATE (DRAMAMINE) 50 MG tablet, Take 1 tablet by mouth 4 (Four) Times a Day As Needed for Movement Disorders., Disp: 30 tablet, Rfl: 0  •  estradiol (ESTRACE) 0.1 MG/GM vaginal cream, Insert 2 g into the vagina 2 (Two) Times a Week., Disp: , Rfl:   •  ondansetron ODT (ZOFRAN ODT) 4 MG disintegrating tablet, Take 1 tablet by mouth Every 8 (Eight) Hours As Needed for Nausea or Vomiting., Disp: 20 tablet, Rfl: 0  •  promethazine (PHENERGAN) 12.5 MG tablet, Take 1 tablet by mouth Every 6 (Six) Hours As Needed for Nausea or Vomiting., Disp: 20 tablet, Rfl: 0  •  buPROPion XL (WELLBUTRIN XL) 300 MG 24 hr tablet, Take 300 mg by mouth Daily., Disp: , Rfl:   •  estradiol (CLIMARA) 0.075 MG/24HR patch, Place 1 patch on the skin 2 (Two) Times a Week., Disp: , Rfl:   •  omeprazole (priLOSEC) 20 MG capsule, Take 20 mg by mouth As Needed., Disp: , Rfl:   •  progesterone (PROMETRIUM) 100 MG capsule, Take 100 mg by mouth Every Night., Disp: , Rfl:      Allergies    No Known Allergies    Problem List    Patient Active Problem List   Diagnosis   • Other specified hypothyroidism   • Anxiety   • Benign paroxysmal positional vertigo due to bilateral vestibular disorder   • Rash in adult       Medications, Allergies, Problems List and Past History were reviewed and updated.    Physical Examination    /78 (BP Location: Left arm, Patient Position: Sitting,  "Cuff Size: Adult)   Pulse 75   Temp 98 °F (36.7 °C) (Infrared)   Resp 18   Ht 156 cm (61.42\")   Wt 90 kg (198 lb 6.4 oz)   SpO2 99%   Breastfeeding No   BMI 36.98 kg/m²     HEENT: Head- Normocephalic Atraumatic. Facies- Within normal limits. Pinnas- Normal texture and shape bilaterally. Canals- Normal bilaterally. TMs- Normal bilaterally. Nares- Patent bilaterally. Nasal Septum- is normal. There is no tenderness to palpation over the frontal or maxillary sinuses. Lids- Normal bilaterally. Conjunctiva- Clear bilaterally. Sclera- Anicteric bilaterally. Oropharynx- Moist with no lesions. Tonsils- No enlargement, erythema or exudate.    Neck: Thyroid- non enlarged, symmetric and has no nodules. No bruits are detected.    Lungs: Auscultation- Clear to auscultation bilaterally. There are no retractions, clubbing or cyanosis. The Expiratory to Inspiratory ratio is equal.    Cardiovascular: There are no carotid bruits. Heart- Normal Rate with Regular rhythm and no murmurs. There are no gallops. There are no rubs. In the lower extremities there is no edema. The upper extremities do not have edema.    Abdomen: Soft, benign, non-tender with no masses, hernias, organomegaly or scars.    Impression and Assessment    Hypothyroidism.    Gastroesophageal Reflux Disease.    Plan    Gastroesophageal Reflux Disease Plan: The condition is stable. No change is needed in the current plan.    Hypothyroidism Plan: Will review records and decide on next steps. I will call her later this week.    Diagnoses and all orders for this visit:    1. Hypothyroidism, unspecified type (Primary)    2. Gastroesophageal reflux disease without esophagitis        Return to Office    The patient was instructed to return for follow-up in 2 months. The patient was instructed to return sooner if the condition changes, worsens, or does not resolve.  "

## 2021-12-22 ENCOUNTER — TELEPHONE (OUTPATIENT)
Dept: INTERNAL MEDICINE | Facility: CLINIC | Age: 61
End: 2021-12-22

## 2022-01-31 ENCOUNTER — TELEMEDICINE (OUTPATIENT)
Dept: INTERNAL MEDICINE | Facility: CLINIC | Age: 62
End: 2022-01-31

## 2022-01-31 DIAGNOSIS — E03.9 HYPOTHYROIDISM, UNSPECIFIED TYPE: Primary | ICD-10-CM

## 2022-01-31 PROCEDURE — 99212 OFFICE O/P EST SF 10 MIN: CPT | Performed by: INTERNAL MEDICINE

## 2022-01-31 NOTE — PROGRESS NOTES
Chief Complaint   Patient presents with   • Follow-up     Hypothyroidism     Telemedicine was provided via two-way interactive audiovisual telecommunication (Zoom) due to the COVID-19 outbreak in order to minimize risk of exposure.    Others in attendance:  None    Risks, benefits and alternative including in-person office visit have been explained to the patient, and the patient has consented to this mode of care.  The visit was conducted on a secure line.  All parties in the room, if any other, were identified and approved by the patient prior to the telemedicine visit.  No technical issues were experienced.  A level of care equivalent to in-person care was achieved.      History of Present Illness    The patient presents for a follow-up related to hypothyroidism. She reports that she does not have as much energy as usual. She reports no hair loss, heat intolerance, cold intolerance, diarrhea, constipation, sweats or palpitations. She is not on thyroid medication.    Review of Systems    CONSTITUTIONAL- Denies Unexplained Weight Loss, Fever, Chills, Sweats, Weakness or Malaise.    PULMONARY- Denies Wheezing, Dyspnea, Sputum Production, Cough, Hemoptysis or Pleuritic Chest Pain.    CARDIOVASCULAR- Denies Chest Pain, Claudication, Edema, Syncope or Irregular Heart Beat.    Medications      Current Outpatient Medications:   •  amitriptyline (ELAVIL) 10 MG tablet, Take 10 mg by mouth Every Night., Disp: , Rfl:   •  buPROPion XL (WELLBUTRIN XL) 300 MG 24 hr tablet, Take 300 mg by mouth Daily., Disp: , Rfl:   •  dimenhyDRINATE (DRAMAMINE) 50 MG tablet, Take 1 tablet by mouth 4 (Four) Times a Day As Needed for Movement Disorders., Disp: 30 tablet, Rfl: 0  •  estradiol (CLIMARA) 0.075 MG/24HR patch, Place 1 patch on the skin 2 (Two) Times a Week., Disp: , Rfl:   •  estradiol (ESTRACE) 0.1 MG/GM vaginal cream, Insert 2 g into the vagina 2 (Two) Times a Week., Disp: , Rfl:   •  omeprazole (priLOSEC) 20 MG capsule, Take 20  mg by mouth As Needed., Disp: , Rfl:   •  ondansetron ODT (ZOFRAN ODT) 4 MG disintegrating tablet, Take 1 tablet by mouth Every 8 (Eight) Hours As Needed for Nausea or Vomiting., Disp: 20 tablet, Rfl: 0  •  progesterone (PROMETRIUM) 100 MG capsule, Take 100 mg by mouth Every Night., Disp: , Rfl:   •  promethazine (PHENERGAN) 12.5 MG tablet, Take 1 tablet by mouth Every 6 (Six) Hours As Needed for Nausea or Vomiting., Disp: 20 tablet, Rfl: 0     Allergies    No Known Allergies    Problem List    Patient Active Problem List   Diagnosis   • Other specified hypothyroidism   • Anxiety   • Benign paroxysmal positional vertigo due to bilateral vestibular disorder   • Rash in adult       Medications, Allergies, Problems List and Past History were reviewed and updated.    Physical Examination    There were no vitals taken for this visit.    HEENT: Facies- Within normal limits. Lids- Normal bilaterally. Conjunctiva- Clear bilaterally. Sclera- Anicteric bilaterally.    Lungs: Auscultation- Clear to auscultation bilaterally. There are no retractions, clubbing or cyanosis. The Expiratory to Inspiratory ratio is equal.    Impression and Assessment    Hypothyroidism.    Plan    Hypothyroidism Plan: She was referred to endocrinology.    Diagnoses and all orders for this visit:    1. Hypothyroidism, unspecified type (Primary)  -     TSH; Future  -     T4, Free; Future  -     T3, Free; Future  -     Ambulatory Referral to Endocrinology        Return to Office    The patient was instructed to return for follow-up in 3 months. The patient was instructed to return sooner if the condition changes, worsens, or does not resolve.

## 2022-02-07 ENCOUNTER — LAB (OUTPATIENT)
Dept: INTERNAL MEDICINE | Facility: CLINIC | Age: 62
End: 2022-02-07

## 2022-02-07 ENCOUNTER — LAB (OUTPATIENT)
Dept: LAB | Facility: HOSPITAL | Age: 62
End: 2022-02-07

## 2022-02-07 DIAGNOSIS — E03.9 HYPOTHYROIDISM, UNSPECIFIED TYPE: ICD-10-CM

## 2022-02-07 LAB
T3FREE SERPL-MCNC: 2.65 PG/ML (ref 2–4.4)
T4 FREE SERPL-MCNC: 1.15 NG/DL (ref 0.93–1.7)
TSH SERPL DL<=0.05 MIU/L-ACNC: 4.28 UIU/ML (ref 0.27–4.2)

## 2022-02-07 PROCEDURE — 84443 ASSAY THYROID STIM HORMONE: CPT | Performed by: INTERNAL MEDICINE

## 2022-02-07 PROCEDURE — 84481 FREE ASSAY (FT-3): CPT | Performed by: INTERNAL MEDICINE

## 2022-02-07 PROCEDURE — 84439 ASSAY OF FREE THYROXINE: CPT | Performed by: INTERNAL MEDICINE

## 2022-04-07 ENCOUNTER — TRANSCRIBE ORDERS (OUTPATIENT)
Dept: ADMINISTRATIVE | Facility: HOSPITAL | Age: 62
End: 2022-04-07

## 2022-04-07 DIAGNOSIS — Z12.31 VISIT FOR SCREENING MAMMOGRAM: ICD-10-CM

## 2022-04-07 DIAGNOSIS — R93.7 ABNORMAL BONE XRAY: Primary | ICD-10-CM

## 2022-07-08 ENCOUNTER — HOSPITAL ENCOUNTER (OUTPATIENT)
Dept: BONE DENSITY | Facility: HOSPITAL | Age: 62
Discharge: HOME | End: 2022-07-08

## 2022-07-08 ENCOUNTER — HOSPITAL ENCOUNTER (OUTPATIENT)
Dept: MAMMOGRAPHY | Facility: HOSPITAL | Age: 62
Discharge: HOME | End: 2022-07-08

## 2022-07-08 DIAGNOSIS — R93.7 ABNORMAL BONE XRAY: ICD-10-CM

## 2022-07-08 DIAGNOSIS — Z12.31 VISIT FOR SCREENING MAMMOGRAM: ICD-10-CM

## 2022-07-08 PROCEDURE — 77080 DXA BONE DENSITY AXIAL: CPT

## 2022-07-08 PROCEDURE — 77067 SCR MAMMO BI INCL CAD: CPT

## 2022-07-08 PROCEDURE — 77063 BREAST TOMOSYNTHESIS BI: CPT | Performed by: RADIOLOGY

## 2022-07-08 PROCEDURE — 77067 SCR MAMMO BI INCL CAD: CPT | Performed by: RADIOLOGY

## 2022-07-08 PROCEDURE — 77063 BREAST TOMOSYNTHESIS BI: CPT

## 2022-11-21 NOTE — TELEPHONE ENCOUNTER
Spoke to pt, advised of clinical message. Good verbal  understanding. Pt states her left leg rash has healed, and she states she will contact [facility] to cancel that prescription since pt states she did not request that medication.     Please advise if further F/U required?   room air

## 2023-07-27 ENCOUNTER — HOSPITAL ENCOUNTER (OUTPATIENT)
Dept: MAMMOGRAPHY | Facility: HOSPITAL | Age: 63
Discharge: HOME | End: 2023-07-27
Admitting: NURSE PRACTITIONER
Payer: COMMERCIAL

## 2023-07-27 DIAGNOSIS — Z12.31 ENCOUNTER FOR SCREENING MAMMOGRAM FOR BREAST CANCER: ICD-10-CM

## 2023-07-27 PROCEDURE — 77067 SCR MAMMO BI INCL CAD: CPT

## 2023-07-27 PROCEDURE — 77063 BREAST TOMOSYNTHESIS BI: CPT

## 2023-12-26 ENCOUNTER — OFFICE VISIT (OUTPATIENT)
Dept: INTERNAL MEDICINE | Facility: CLINIC | Age: 63
End: 2023-12-26
Payer: COMMERCIAL

## 2023-12-26 VITALS
HEIGHT: 62 IN | SYSTOLIC BLOOD PRESSURE: 122 MMHG | DIASTOLIC BLOOD PRESSURE: 70 MMHG | TEMPERATURE: 97.1 F | RESPIRATION RATE: 16 BRPM | BODY MASS INDEX: 37.69 KG/M2 | WEIGHT: 204.8 LBS | HEART RATE: 73 BPM

## 2023-12-26 DIAGNOSIS — Z00.00 PHYSICAL EXAM: Primary | ICD-10-CM

## 2023-12-26 DIAGNOSIS — Z23 IMMUNIZATION DUE: ICD-10-CM

## 2023-12-26 DIAGNOSIS — Z13.6 SCREENING FOR CARDIOVASCULAR CONDITION: ICD-10-CM

## 2023-12-26 DIAGNOSIS — F41.8 DEPRESSION WITH ANXIETY: ICD-10-CM

## 2023-12-26 DIAGNOSIS — E03.9 HYPOTHYROIDISM, UNSPECIFIED TYPE: ICD-10-CM

## 2023-12-26 LAB
ALBUMIN SERPL-MCNC: 4.8 G/DL (ref 3.5–5.2)
ALBUMIN/GLOB SERPL: 2 G/DL
ALP SERPL-CCNC: 73 U/L (ref 39–117)
ALT SERPL W P-5'-P-CCNC: 19 U/L (ref 1–33)
ANION GAP SERPL CALCULATED.3IONS-SCNC: 13 MMOL/L (ref 5–15)
AST SERPL-CCNC: 22 U/L (ref 1–32)
BASOPHILS # BLD AUTO: 0.03 10*3/MM3 (ref 0–0.2)
BASOPHILS NFR BLD AUTO: 0.7 % (ref 0–1.5)
BILIRUB BLD-MCNC: NEGATIVE MG/DL
BILIRUB SERPL-MCNC: 0.3 MG/DL (ref 0–1.2)
BUN SERPL-MCNC: 13 MG/DL (ref 8–23)
BUN/CREAT SERPL: 17.1 (ref 7–25)
CALCIUM SPEC-SCNC: 9.7 MG/DL (ref 8.6–10.5)
CHLORIDE SERPL-SCNC: 104 MMOL/L (ref 98–107)
CHOLEST SERPL-MCNC: 229 MG/DL (ref 0–200)
CLARITY, POC: CLEAR
CO2 SERPL-SCNC: 24 MMOL/L (ref 22–29)
COLOR UR: YELLOW
CREAT SERPL-MCNC: 0.76 MG/DL (ref 0.57–1)
DEPRECATED RDW RBC AUTO: 43.7 FL (ref 37–54)
EGFRCR SERPLBLD CKD-EPI 2021: 88.2 ML/MIN/1.73
EOSINOPHIL # BLD AUTO: 0.2 10*3/MM3 (ref 0–0.4)
EOSINOPHIL NFR BLD AUTO: 4.5 % (ref 0.3–6.2)
ERYTHROCYTE [DISTWIDTH] IN BLOOD BY AUTOMATED COUNT: 13.4 % (ref 12.3–15.4)
EXPIRATION DATE: NORMAL
GLOBULIN UR ELPH-MCNC: 2.4 GM/DL
GLUCOSE SERPL-MCNC: 90 MG/DL (ref 65–99)
GLUCOSE UR STRIP-MCNC: NEGATIVE MG/DL
HCT VFR BLD AUTO: 41.2 % (ref 34–46.6)
HDLC SERPL-MCNC: 84 MG/DL (ref 40–60)
HGB BLD-MCNC: 14 G/DL (ref 12–15.9)
IMM GRANULOCYTES # BLD AUTO: 0.01 10*3/MM3 (ref 0–0.05)
IMM GRANULOCYTES NFR BLD AUTO: 0.2 % (ref 0–0.5)
KETONES UR QL: NEGATIVE
LDLC SERPL CALC-MCNC: 135 MG/DL (ref 0–100)
LDLC/HDLC SERPL: 1.58 {RATIO}
LEUKOCYTE EST, POC: NEGATIVE
LYMPHOCYTES # BLD AUTO: 1.43 10*3/MM3 (ref 0.7–3.1)
LYMPHOCYTES NFR BLD AUTO: 32.1 % (ref 19.6–45.3)
Lab: NORMAL
MCH RBC QN AUTO: 30.1 PG (ref 26.6–33)
MCHC RBC AUTO-ENTMCNC: 34 G/DL (ref 31.5–35.7)
MCV RBC AUTO: 88.6 FL (ref 79–97)
MONOCYTES # BLD AUTO: 0.39 10*3/MM3 (ref 0.1–0.9)
MONOCYTES NFR BLD AUTO: 8.8 % (ref 5–12)
NEUTROPHILS NFR BLD AUTO: 2.39 10*3/MM3 (ref 1.7–7)
NEUTROPHILS NFR BLD AUTO: 53.7 % (ref 42.7–76)
NITRITE UR-MCNC: NEGATIVE MG/ML
NRBC BLD AUTO-RTO: 0 /100 WBC (ref 0–0.2)
PH UR: 6.5 [PH] (ref 5–8)
PLATELET # BLD AUTO: 253 10*3/MM3 (ref 140–450)
PMV BLD AUTO: 10.6 FL (ref 6–12)
POTASSIUM SERPL-SCNC: 3.9 MMOL/L (ref 3.5–5.2)
PROT SERPL-MCNC: 7.2 G/DL (ref 6–8.5)
PROT UR STRIP-MCNC: NEGATIVE MG/DL
RBC # BLD AUTO: 4.65 10*6/MM3 (ref 3.77–5.28)
RBC # UR STRIP: NEGATIVE /UL
SODIUM SERPL-SCNC: 141 MMOL/L (ref 136–145)
SP GR UR: 1.02 (ref 1–1.03)
T4 FREE SERPL-MCNC: 1.48 NG/DL (ref 0.93–1.7)
TRIGL SERPL-MCNC: 60 MG/DL (ref 0–150)
TSH SERPL DL<=0.05 MIU/L-ACNC: 1.62 UIU/ML (ref 0.27–4.2)
UROBILINOGEN UR QL: NORMAL
VLDLC SERPL-MCNC: 10 MG/DL (ref 5–40)
WBC NRBC COR # BLD AUTO: 4.45 10*3/MM3 (ref 3.4–10.8)

## 2023-12-26 PROCEDURE — 80061 LIPID PANEL: CPT | Performed by: INTERNAL MEDICINE

## 2023-12-26 PROCEDURE — 80050 GENERAL HEALTH PANEL: CPT | Performed by: INTERNAL MEDICINE

## 2023-12-26 PROCEDURE — 84439 ASSAY OF FREE THYROXINE: CPT | Performed by: INTERNAL MEDICINE

## 2023-12-26 RX ORDER — AMOXICILLIN AND CLAVULANATE POTASSIUM 875; 125 MG/1; MG/1
TABLET, FILM COATED ORAL
COMMUNITY
Start: 2023-12-09 | End: 2023-12-26

## 2023-12-26 RX ORDER — MELOXICAM 15 MG/1
15 TABLET ORAL DAILY
COMMUNITY
Start: 2023-09-02

## 2023-12-26 RX ORDER — SERTRALINE HYDROCHLORIDE 25 MG/1
25 TABLET, FILM COATED ORAL DAILY
Qty: 30 TABLET | Refills: 5 | Status: SHIPPED | OUTPATIENT
Start: 2023-12-26

## 2023-12-26 RX ORDER — DIAZEPAM 5 MG/1
TABLET ORAL
COMMUNITY

## 2023-12-26 RX ORDER — ONDANSETRON 4 MG/1
TABLET, FILM COATED ORAL
COMMUNITY
End: 2023-12-26 | Stop reason: SDUPTHER

## 2023-12-26 RX ORDER — LEVOTHYROXINE SODIUM 0.05 MG/1
50 TABLET ORAL
Qty: 90 TABLET | Refills: 1 | Status: SHIPPED | OUTPATIENT
Start: 2023-12-26

## 2023-12-26 NOTE — LETTER
[facility]  Vaccine Consent Form    Patient Name:  Billie Foster  Patient :  1960     Vaccine(s) Ordered    Fluzone (or Fluarix & Flulaval for VFC) >6mos  Tdap Vaccine Greater Than or Equal To 6yo IM        Screening Checklist  The following questions should be completed prior to vaccination. If you answer “yes” to any question, it does not necessarily mean you should not be vaccinated. It just means we may need to clarify or ask more questions. If a question is unclear, please ask your healthcare provider to explain it.    Yes No   Any fever or moderate to severe illness today (mild illness and/or antibiotic treatment are not contraindications)?     Do you have a history of a serious reaction to any previous vaccinations, such as anaphylaxis, encephalopathy within 7 days, Guillain-[place] syndrome within 6 weeks, seizure?     Have you received any live vaccine(s) (e.g MMR, NIKI) or any other vaccines in the last month (to ensure duplicate doses aren't given)?     Do you have an anaphylactic allergy to latex (DTaP, DTaP-IPV, Hep A, Hep B, MenB, RV, Td, Tdap), baker’s yeast (Hep B, HPV), polysorbates (RSV, nirsevimab, PCV 20, Rotavirrus, Tdap, Shingrix), or gelatin (NIKI, MMR)?     Do you have an anaphylactic allergy to neomycin (Rabies, NIKI, MMR, IPV, Hep A), polymyxin B (IPV), or streptomycin (IPV)?      Any cancer, leukemia, AIDS, or other immune system disorder? (NIKI, MMR, RV)     Do you have a parent, brother, or sister with an immune system problem (if immune competence of vaccine recipient clinically verified, can proceed)? (MMR, NIKI)     Any recent steroid treatments for >2 weeks, chemotherapy, or radiation treatment? (NIKI, MMR)     Have you received antibody-containing blood transfusions or IVIG in the past 11 months (recommended interval is dependent on product)? (MMR, NIKI)     Have you taken antiviral drugs (acyclovir, famciclovir, valacyclovir for NIKI) in the last 24 or 48 hours,  "respectively?      Are you pregnant or planning to become pregnant within 1 month? (NIKI, MMR, HPV, IPV, MenB, Abrexvy; For Hep B- refer to Engerix-B; For RSV - Abrysvo is indicated for 32-36 weeks of pregnancy from September to January)     For infants, have you ever been told your child has had intussusception or a medical emergency involving obstruction of the intestine (Rotavirus)? If not for an infant, can skip this question.         *Ordering Physicians/APC should be consulted if \"yes\" is checked by the patient or guardian above.  I have received, read, and understand the Vaccine Information Statement (VIS) for each vaccine ordered.  I have considered my or my child's health status as well as the health status of my close contacts.  I have taken the opportunity to discuss my vaccine questions with my or my child's health care provider.   I have requested that the ordered vaccine(s) be given to me or my child.  I understand the benefits and risks of the vaccines.  I understand that I should remain in the clinic for 15 minutes after receiving the vaccine(s).  _________________________________________________________  Signature of Patient or Parent/Legal Guardian ____________________  Date     "

## 2023-12-26 NOTE — PROGRESS NOTES
Chief Complaint   Patient presents with    Annual Exam    Anxiety       History of Present Illness    The patient presents for an established patient physical examination and health maintenance visit.    The patient presents for a follow-up related to hypothyroidism. She reports a good energy level. She reports no hair loss, heat intolerance, cold intolerance, diarrhea, constipation, sweats or palpitations. She is taking her medication as prescribed.    The patient presents for a follow-up related to depression with anxiety. Her energy level is good. She denies agorophobia. She sleeps well. She has been on a medication in the past, but is not currently on a medication. The medication regimen consisted of Wellbutrin XL. The patient denies having medication side effects including nausea, headaches, anxiety, increased depression or fatigue.    Medications      Current Outpatient Medications:     diazePAM (VALIUM) 5 MG tablet, TAKE 1 TABLET BY MOUTH EVERY 8 HOURS AS NEEDED FOR ANXIETY/VERTIGO, Disp: , Rfl:     dimenhyDRINATE (DRAMAMINE) 50 MG tablet, Take 1 tablet by mouth 4 (Four) Times a Day As Needed for Movement Disorders., Disp: 30 tablet, Rfl: 0    estradiol (ESTRACE) 0.1 MG/GM vaginal cream, Insert 2 applicators into the vagina 2 (Two) Times a Week., Disp: , Rfl:     levothyroxine (SYNTHROID, LEVOTHROID) 50 MCG tablet, Take 1 tablet by mouth Every Morning Before Breakfast., Disp: 90 tablet, Rfl: 1    meloxicam (MOBIC) 15 MG tablet, Take 1 tablet by mouth Daily. with food., Disp: , Rfl:     ondansetron ODT (ZOFRAN ODT) 4 MG disintegrating tablet, Take 1 tablet by mouth Every 8 (Eight) Hours As Needed for Nausea or Vomiting., Disp: 20 tablet, Rfl: 0    promethazine (PHENERGAN) 12.5 MG tablet, Take 1 tablet by mouth Every 6 (Six) Hours As Needed for Nausea or Vomiting., Disp: 20 tablet, Rfl: 0    sertraline (Zoloft) 25 MG tablet, Take 1 tablet by mouth Daily., Disp: 30 tablet, Rfl: 5     Allergies    No Known  "Allergies    Problem List    Patient Active Problem List   Diagnosis    Other specified hypothyroidism    Anxiety    Benign paroxysmal positional vertigo due to bilateral vestibular disorder    Rash in adult       Medications, Allergies, Problems List and Past History were reviewed and updated.    Physical Examination    /70 (BP Location: Right arm, Patient Position: Sitting, Cuff Size: Adult)   Pulse 73   Temp 97.1 °F (36.2 °C) (Temporal)   Resp 16   Ht 157.5 cm (62\")   Wt 92.9 kg (204 lb 12.8 oz)   BMI 37.46 kg/m²       HEENT: Head- Normocephalic Atraumatic. Facies- Within normal limits. Pinnas- Normal texture and shape bilaterally. Canals- Normal bilaterally. TMs- Normal bilaterally. Nares- Patent bilaterally. Nasal Septum- is normal. There is no tenderness to palpation over the frontal or maxillary sinuses. Lids- Normal bilaterally. Conjunctiva- Clear bilaterally. Sclera- Anicteric bilaterally. Oropharynx- Moist with no lesions. Tonsils- No enlargement, erythema or exudate.    Neck: Thyroid- non enlarged, symmetric and has no nodules. No bruits are detected. ROM- Normal Range of Motion with no rigidity.    Lungs: Auscultation- Clear to auscultation bilaterally. There are no retractions, clubbing or cyanosis. The Expiratory to Inspiratory ratio is equal.    Lymph Nodes: Cervical- no enlarged lymph nodes noted. Clavicular- no enlarged supraclavicular lymph nodes noted. Axillary- no enlarged axillary lymph nodes noted. Inguinal- no enlarged inguinal lymph nodes noted.    Cardiovascular: There are no carotid bruits. Heart- Normal Rate with Regular rhythm and no murmurs. There are no gallops. There are no rubs. In the lower extremities there is no edema. The upper extremities do not have edema.    Abdomen: Soft, benign, non-tender with no masses, hernias, organomegaly or scars.    Breast: Normal contours bilaterally with no masses, discharge, skin changes or lumps. There are no scars " noted.    Dermatologic: The patient has no worrisome or suspicious skin lesions noted.    Impression and Assessment    Normal Physical Examination.    Encounter for Immunization Administration.    Hypothyroidism.    Depression with Anxiety.    Plan    Hypothyroidism Plan: The patient was instructed to continue the current medications.    Depression with Anxiety Plan: Medication will be added as noted.    Counseling was provided regarding: Adequate Aerobic Exercise, Flossing Teeth, Heart Healthy Diet and Weight Lose.    The following was ordered for screening and health maintenance: CBC w Automated Diff, CMP, Lipid Profile and U/A.    Counseled regarding immunizations and applicable VIS given.    Immunizations Ordered and Administered: Influenza and Tdap.    Diagnoses and all orders for this visit:    1. Physical exam (Primary)  -     CBC & Differential; Future  -     Comprehensive Metabolic Panel; Future  -     POC Urinalysis Dipstick, Automated; Future  -     CBC & Differential  -     Comprehensive Metabolic Panel    2. Hypothyroidism, unspecified type  -     levothyroxine (SYNTHROID, LEVOTHROID) 50 MCG tablet; Take 1 tablet by mouth Every Morning Before Breakfast.  Dispense: 90 tablet; Refill: 1  -     TSH; Future  -     T4, Free; Future  -     TSH  -     T4, Free    3. Depression with anxiety  -     sertraline (Zoloft) 25 MG tablet; Take 1 tablet by mouth Daily.  Dispense: 30 tablet; Refill: 5    4. Immunization due  -     Fluzone (or Fluarix & Flulaval for VFC) >6mos  -     Tdap Vaccine Greater Than or Equal To 6yo IM    5. Screening for cardiovascular condition  -     Lipid Panel; Future  -     Lipid Panel    Class 2 Severe Obesity (BMI >=35 and <=39.9). Obesity-related health conditions include the following: none. Obesity is worsening. BMI is is above average; BMI management plan is completed. We discussed low calorie, low carb based diet program, portion control, increasing exercise, management of  depression/anxiety/stress to control compensatory eating, and Information on healthy weight added to patient's after visit summary.      Return to Office    The patient was instructed to return for follow-up in 3 months. The patient was instructed to return sooner if the condition changes, worsens, or does not resolve.

## 2024-01-29 DIAGNOSIS — E66.9 OBESITY WITHOUT SERIOUS COMORBIDITY, UNSPECIFIED CLASSIFICATION, UNSPECIFIED OBESITY TYPE: Primary | ICD-10-CM

## 2024-02-05 ENCOUNTER — HOSPITAL ENCOUNTER (OUTPATIENT)
Dept: NUTRITION | Facility: HOSPITAL | Age: 64
Setting detail: RECURRING SERIES
Discharge: HOME | End: 2024-02-05

## 2024-02-05 PROCEDURE — 97802 MEDICAL NUTRITION INDIV IN: CPT

## 2024-02-05 NOTE — CONSULTS
[facility]          Initial 60 Minute Nutrition Visit    Date: 2024   Patient Name: Billie Foster  : 1960   MRN: [id number]   Referring Provider: Arjun Booth MD    Reason for Visit: Weight loss  Visit Format: IP    Nutrition Assessment       Social History:   Social History     Socioeconomic History    Marital status:    Tobacco Use    Smoking status: Never    Smokeless tobacco: Never   Substance and Sexual Activity    Alcohol use: No    Drug use: No    Sexual activity: Defer     Active Problem List:   Patient Active Problem List    Diagnosis     Anxiety [F41.9]     Benign paroxysmal positional vertigo due to bilateral vestibular disorder [H81.13]     Rash in adult [R21]     Other specified hypothyroidism [E03.8]       Current Medications:   Current Outpatient Medications:     diazePAM (VALIUM) 5 MG tablet, TAKE 1 TABLET BY MOUTH EVERY 8 HOURS AS NEEDED FOR ANXIETY/VERTIGO, Disp: , Rfl:     dimenhyDRINATE (DRAMAMINE) 50 MG tablet, Take 1 tablet by mouth 4 (Four) Times a Day As Needed for Movement Disorders., Disp: 30 tablet, Rfl: 0    estradiol (ESTRACE) 0.1 MG/GM vaginal cream, Insert 2 applicators into the vagina 2 (Two) Times a Week., Disp: , Rfl:     levothyroxine (SYNTHROID, LEVOTHROID) 50 MCG tablet, Take 1 tablet by mouth Every Morning Before Breakfast., Disp: 90 tablet, Rfl: 1    meloxicam (MOBIC) 15 MG tablet, Take 1 tablet by mouth Daily. with food., Disp: , Rfl:     ondansetron ODT (ZOFRAN ODT) 4 MG disintegrating tablet, Take 1 tablet by mouth Every 8 (Eight) Hours As Needed for Nausea or Vomiting., Disp: 20 tablet, Rfl: 0    promethazine (PHENERGAN) 12.5 MG tablet, Take 1 tablet by mouth Every 6 (Six) Hours As Needed for Nausea or Vomiting., Disp: 20 tablet, Rfl: 0    sertraline (Zoloft) 25 MG tablet, Take 1 tablet by mouth Daily., Disp: 30 tablet, Rfl: 5    Labs: NA    Hunger Vital Sign Food Insecurity Assessment:  Within the past  "12 months I/we worried whether our food would run out before I/we got money to buy more: No   Within the past 12 months the food I/we bought just didn't last and I/we didn't have money to get more: No   Use of food assistance programs (WIC, food stamps, food mejia) No       Food & Nutrition Related History       Food Allergies: NKFA  Food Intolerances: None   Food Behavior: None indicated  Nutrition Impact Symptoms:  Occasional constipation  Gastrointestinal conditions that impact intake or food choices: none  Details at home: None indicated  Who prepares most meals: patient   Who does grocery shopping: patient   How many meals are purchased from fast food/sit down restaurants per week:  unknown  Difficulty chewin - Normal  Difficulty swallowin - Normal  Diet requirement related to personal preference or cultural belief:  Patient does not enjoy eating much meat  History of eating disorder/disordered eating habits: None  Language/communication details: English  Barriers to learning: No barriers identified at this time    24 Hour Recall:   Time Food/beverages consumed                                     Additional comments: See assessment. Patient grazes and typically has 2 protein shakes per day. Drinks 4 glasses of water (~48 oz).     Anthropometrics      Height:   Ht Readings from Last 1 Encounters:   23 157.5 cm (62\")     Weight:   Wt Readings from Last 3 Encounters:   23 92.9 kg (204 lb 12.8 oz)   23 91.1 kg (200 lb 12.8 oz)   21 90 kg (198 lb 6.4 oz)     BMI: There is no height or weight on file to calculate BMI.   Weight Change: Weight has fluctuated over the last several years d/t diet programs     Physical Activity     Barriers to physical activity: None indicated     Physical activity comments: Unknown     Estimated Needs     Estimated Energy Needs: 1500 kcals    Estimated Protein Needs: 80 grams     Estimated Fluid Needs: At least 64 oz/d     Discussion / Education  "     Patient seen in person for weight loss consult. Patient reports she has tried multiple different diet programs and medications since she was a teen. Patient reports she has hypothyroidism.     RD discussed the 3 macronutrient's and how they function in our body. RD explained the importance of pairing a carb with a protein at all meals and snacks to increase satiety. RD reviewed saturated fat vs unsaturated fat. Patient encouraged to reduce saturated fat intake and increase unsaturated fats. RD recommended increasing fiber to 25 grams per day. Fiber helps slow down digestion, regulate GI, regulate blood glucose levels, and regulate cholesterol. High fiber food list provided.     Recommended increasing water intake to 64 oz per day. RD reviewed easy snack options she can have on the go. Including popcorn with turkey jerky, nuts with dried fruit, and an apple with peanut butter. RD recommended consume 4 small meals or snacks per day. Discussed adding a carb source along with protein shakes. RD recommended protein sources such as nuts, seeds, nut butters, cheese, chickpea pasta, beans, and fish as patient does not enjoy meats. Patient accepting of this.     RD provided calorie and protein recommendations. Patient to eat in a calorie deficit and ensure she is consuming enough protein.     RD office line provided. Patient encouraged to reach out with questions.     Assessment of patient engagement: Engaged    Measurement of understanding: Patient verbalized understanding    Resources Provided: 3 macronutrients, macronutrients foundation, high fiber snack list, breakfast ideas, plate method     Goal (s)      Goal 1: Increase fiber intake     Goal 2: Pair a carb with a protein     Goal 3: Consume 1500 kcals per day     Plan of Care     PES Statement:   Overweight / Obesity related to diet and lifestye as evidenced by BMI.     Follow Up Visit      Follow Up:   03/12 @ 8:30 AM    Total of 60 minutes spent with patient on  nutrition counseling. Education based on Academy of Nutrition and Dietetics guidelines. Patient was provided with RD's contact information. Thank you for this referral.

## 2024-03-04 ENCOUNTER — HOSPITAL ENCOUNTER (OUTPATIENT)
Dept: NUTRITION | Facility: HOSPITAL | Age: 64
Setting detail: RECURRING SERIES
Discharge: HOME | End: 2024-03-04

## 2024-03-04 NOTE — PROGRESS NOTES
Patient seen in person for weight loss. Patient accompanied by sister. Patient reports she has been staying with in calorie recommendations 1500 kcals. Patient hitting ~60-65 grams of protein, struggling to hit 80 grams. Patient getting ~48 oz of water per day. Patient has increased fiber through chickpea rice and popcorn. Patient to add veggies to taco meat and spinach/kale to smoothies to up fiber.     Patients weight last visit 210#. Patient now weighs 205. Patient happy with changes.     RD office line provided. Patient to reach out with questions.     A total of 30 minutes was spent with the patient. Thank you for this referral.

## 2024-04-01 ENCOUNTER — OFFICE VISIT (OUTPATIENT)
Dept: INTERNAL MEDICINE | Facility: CLINIC | Age: 64
End: 2024-04-01
Payer: COMMERCIAL

## 2024-04-01 VITALS
HEART RATE: 60 BPM | DIASTOLIC BLOOD PRESSURE: 72 MMHG | RESPIRATION RATE: 16 BRPM | SYSTOLIC BLOOD PRESSURE: 138 MMHG | WEIGHT: 203 LBS | TEMPERATURE: 97.8 F | BODY MASS INDEX: 37.13 KG/M2

## 2024-04-01 DIAGNOSIS — F41.8 DEPRESSION WITH ANXIETY: Primary | ICD-10-CM

## 2024-04-01 DIAGNOSIS — E03.9 HYPOTHYROIDISM, UNSPECIFIED TYPE: ICD-10-CM

## 2024-04-01 PROCEDURE — 99213 OFFICE O/P EST LOW 20 MIN: CPT | Performed by: INTERNAL MEDICINE

## 2024-04-01 RX ORDER — LEVOTHYROXINE SODIUM 0.05 MG/1
50 TABLET ORAL
Qty: 90 TABLET | Refills: 1 | Status: SHIPPED | OUTPATIENT
Start: 2024-04-01

## 2024-04-01 NOTE — PROGRESS NOTES
Chief Complaint   Patient presents with    Follow-up     3 month follow up medication       History of Present Illness      The patient presents for a follow-up related to depression with anxiety. Her energy level is good. She denies agorophobia. She sleeps well. She is currently taking a medication. The current medication regimen consists of sertraline. She does still have some anxiety symptoms but feels that the sertaline is gioving some relief. The patient denies having medication side effects including nausea, headaches, anxiety, increased depression, anorgasmia or fatigue.    Medications      Current Outpatient Medications:     diazePAM (VALIUM) 5 MG tablet, TAKE 1 TABLET BY MOUTH EVERY 8 HOURS AS NEEDED FOR ANXIETY/VERTIGO, Disp: , Rfl:     dimenhyDRINATE (DRAMAMINE) 50 MG tablet, Take 1 tablet by mouth 4 (Four) Times a Day As Needed for Movement Disorders., Disp: 30 tablet, Rfl: 0    estradiol (ESTRACE) 0.1 MG/GM vaginal cream, Insert 2 g into the vagina 2 (Two) Times a Week., Disp: , Rfl:     ondansetron ODT (ZOFRAN ODT) 4 MG disintegrating tablet, Take 1 tablet by mouth Every 8 (Eight) Hours As Needed for Nausea or Vomiting., Disp: 20 tablet, Rfl: 0    promethazine (PHENERGAN) 12.5 MG tablet, Take 1 tablet by mouth Every 6 (Six) Hours As Needed for Nausea or Vomiting., Disp: 20 tablet, Rfl: 0    sertraline (Zoloft) 25 MG tablet, Take 1 tablet by mouth Daily., Disp: 30 tablet, Rfl: 5    levothyroxine (SYNTHROID, LEVOTHROID) 50 MCG tablet, Take 1 tablet by mouth Every Morning Before Breakfast., Disp: 90 tablet, Rfl: 1     Allergies    No Known Allergies    Problem List    Patient Active Problem List   Diagnosis    Other specified hypothyroidism    Anxiety    Benign paroxysmal positional vertigo due to bilateral vestibular disorder    Rash in adult       Medications, Allergies, Problems List and Past History were reviewed and updated.    Physical Examination    /72 (BP Location: Left arm, Patient  Position: Sitting, Cuff Size: Adult)   Pulse 60   Temp 97.8 °F (36.6 °C) (Infrared)   Resp 16   Wt 92.1 kg (203 lb)   BMI 37.13 kg/m²       Lungs: Auscultation- Clear to auscultation bilaterally. There are no retractions, clubbing or cyanosis. The Expiratory to Inspiratory ratio is equal.    Cardiovascular: There are no carotid bruits. Heart- Normal Rate with Regular rhythm and no murmurs. There are no gallops. There are no rubs. In the lower extremities there is no edema. The upper extremities do not have edema.    Impression and Assessment    Depression with Anxiety.    Plan    Depression with Anxiety Plan: The patient was instructed to continue the current medications.    Diagnoses and all orders for this visit:    1. Depression with anxiety (Primary)  -     sertraline (Zoloft) 50 MG tablet; Take 1 tablet by mouth Daily.  Dispense: 30 tablet; Refill: 5    2. Hypothyroidism, unspecified type  -     levothyroxine (SYNTHROID, LEVOTHROID) 50 MCG tablet; Take 1 tablet by mouth Every Morning Before Breakfast.  Dispense: 90 tablet; Refill: 1        Return to Office    The patient was instructed to return for follow-up in 1 month. The patient was instructed to return sooner if the condition changes, worsens, or does not resolve.

## 2024-06-15 DIAGNOSIS — E03.9 HYPOTHYROIDISM, UNSPECIFIED TYPE: ICD-10-CM

## 2024-06-17 RX ORDER — LEVOTHYROXINE SODIUM 0.05 MG/1
50 TABLET ORAL
Qty: 90 TABLET | Refills: 1 | Status: SHIPPED | OUTPATIENT
Start: 2024-06-17

## 2024-08-12 ENCOUNTER — OFFICE VISIT (OUTPATIENT)
Dept: INTERNAL MEDICINE | Facility: CLINIC | Age: 64
End: 2024-08-12
Payer: COMMERCIAL

## 2024-08-12 VITALS
SYSTOLIC BLOOD PRESSURE: 136 MMHG | DIASTOLIC BLOOD PRESSURE: 72 MMHG | TEMPERATURE: 98 F | RESPIRATION RATE: 16 BRPM | WEIGHT: 210 LBS | HEART RATE: 64 BPM | BODY MASS INDEX: 38.41 KG/M2

## 2024-08-12 DIAGNOSIS — E03.9 HYPOTHYROIDISM, UNSPECIFIED TYPE: Primary | ICD-10-CM

## 2024-08-12 DIAGNOSIS — F41.8 DEPRESSION WITH ANXIETY: ICD-10-CM

## 2024-08-12 DIAGNOSIS — R73.9 BLOOD GLUCOSE ELEVATED: ICD-10-CM

## 2024-08-12 LAB
ALBUMIN SERPL-MCNC: 4.4 G/DL (ref 3.5–5.2)
ALBUMIN/GLOB SERPL: 1.7 G/DL
ALP SERPL-CCNC: 90 U/L (ref 39–117)
ALT SERPL W P-5'-P-CCNC: 23 U/L (ref 1–33)
ANION GAP SERPL CALCULATED.3IONS-SCNC: 9 MMOL/L (ref 5–15)
AST SERPL-CCNC: 25 U/L (ref 1–32)
BILIRUB SERPL-MCNC: 0.3 MG/DL (ref 0–1.2)
BUN SERPL-MCNC: 17 MG/DL (ref 8–23)
BUN/CREAT SERPL: 22.1 (ref 7–25)
CALCIUM SPEC-SCNC: 9.9 MG/DL (ref 8.6–10.5)
CHLORIDE SERPL-SCNC: 103 MMOL/L (ref 98–107)
CO2 SERPL-SCNC: 25 MMOL/L (ref 22–29)
CREAT SERPL-MCNC: 0.77 MG/DL (ref 0.57–1)
EGFRCR SERPLBLD CKD-EPI 2021: 86.3 ML/MIN/1.73
GLOBULIN UR ELPH-MCNC: 2.6 GM/DL
GLUCOSE SERPL-MCNC: 90 MG/DL (ref 65–99)
HBA1C MFR BLD: 5.4 % (ref 4.8–5.6)
POTASSIUM SERPL-SCNC: 4.2 MMOL/L (ref 3.5–5.2)
PROT SERPL-MCNC: 7 G/DL (ref 6–8.5)
SODIUM SERPL-SCNC: 137 MMOL/L (ref 136–145)
T4 FREE SERPL-MCNC: 1.31 NG/DL (ref 0.92–1.68)
TSH SERPL DL<=0.05 MIU/L-ACNC: 2.25 UIU/ML (ref 0.27–4.2)

## 2024-08-12 PROCEDURE — 83036 HEMOGLOBIN GLYCOSYLATED A1C: CPT | Performed by: INTERNAL MEDICINE

## 2024-08-12 PROCEDURE — 80053 COMPREHEN METABOLIC PANEL: CPT | Performed by: INTERNAL MEDICINE

## 2024-08-12 PROCEDURE — 84443 ASSAY THYROID STIM HORMONE: CPT | Performed by: INTERNAL MEDICINE

## 2024-08-12 PROCEDURE — 99214 OFFICE O/P EST MOD 30 MIN: CPT | Performed by: INTERNAL MEDICINE

## 2024-08-12 PROCEDURE — 84439 ASSAY OF FREE THYROXINE: CPT | Performed by: INTERNAL MEDICINE

## 2024-08-12 NOTE — PROGRESS NOTES
Chief Complaint   Patient presents with    Follow-up     4 month follow up chronic medical problems       History of Present Illness      The patient presents for a follow-up related to hypothyroidism. She reports a good energy level. She reports no hair loss, heat intolerance, cold intolerance, diarrhea, constipation, sweats or palpitations. She is taking her medication as prescribed.    The patient presents for a follow-up related to depression with anxiety. Her energy level is good. She denies agorophobia. She sleeps well. She is currently taking a medication. The current medication regimen consists of diazepam and sertraline. The benozodiapepines are taken as needed which is usually daily. The patient denies having medication side effects including nausea, headaches, anxiety, increased depression, anorgasmia or fatigue.    The patient presents for a follow-up related to hyperglycemia. She checks her blood sugars at home. Her sugars are checked infrequently. The average sugars are in the 120-170 range. She denies hypoglycemic symptoms. The patient denies polyuria, polydypsia or polyphagia. She reports no symptoms of neuropathy. The patient is not on medication for her hyperglycemia. The patient does check her feet daily at home. She denies chest pain, shortness of breath, orthopnea, paroxysmal nocturnal dyspnea, dyspnea on exertion, edema or syncope.    Medications      Current Outpatient Medications:     diazePAM (VALIUM) 5 MG tablet, TAKE 1 TABLET BY MOUTH EVERY 8 HOURS AS NEEDED FOR ANXIETY/VERTIGO, Disp: , Rfl:     dimenhyDRINATE (DRAMAMINE) 50 MG tablet, Take 1 tablet by mouth 4 (Four) Times a Day As Needed for Movement Disorders., Disp: 30 tablet, Rfl: 0    estradiol (ESTRACE) 0.1 MG/GM vaginal cream, Insert 2 g into the vagina 2 (Two) Times a Week., Disp: , Rfl:     levothyroxine (SYNTHROID, LEVOTHROID) 50 MCG tablet, Take 1 tablet by mouth Every Morning Before Breakfast., Disp: 90 tablet, Rfl: 1     ondansetron ODT (ZOFRAN ODT) 4 MG disintegrating tablet, Take 1 tablet by mouth Every 8 (Eight) Hours As Needed for Nausea or Vomiting., Disp: 20 tablet, Rfl: 0    promethazine (PHENERGAN) 12.5 MG tablet, Take 1 tablet by mouth Every 6 (Six) Hours As Needed for Nausea or Vomiting., Disp: 20 tablet, Rfl: 0    sertraline (Zoloft) 50 MG tablet, Take 1 tablet by mouth Daily., Disp: 30 tablet, Rfl: 5     Allergies    No Known Allergies    Problem List    Patient Active Problem List   Diagnosis    Other specified hypothyroidism    Anxiety    Benign paroxysmal positional vertigo due to bilateral vestibular disorder    Rash in adult       Medications, Allergies, Problems List and Past History were reviewed and updated.    Physical Examination    /72 (BP Location: Left arm, Patient Position: Sitting, Cuff Size: Adult)   Pulse 64   Temp 98 °F (36.7 °C) (Infrared)   Resp 16   Wt 95.3 kg (210 lb)   BMI 38.41 kg/m²       HEENT: Head- Normocephalic Atraumatic. Facies- Within normal limits. Pinnas- Normal texture and shape bilaterally. Canals- Normal bilaterally. TMs- Normal bilaterally. Nares- Patent bilaterally. Nasal Septum- is normal. There is no tenderness to palpation over the frontal or maxillary sinuses. Lids- Normal bilaterally. Conjunctiva- Clear bilaterally. Sclera- Anicteric bilaterally. Oropharynx- Moist with no lesions. Tonsils- No enlargement, erythema or exudate.    Neck: Thyroid- non enlarged, symmetric and has no nodules. No bruits are detected. ROM- Normal Range of Motion with no rigidity.    Lungs: Auscultation- Clear to auscultation bilaterally. There are no retractions, clubbing or cyanosis. The Expiratory to Inspiratory ratio is equal.    Cardiovascular: There are no carotid bruits. Heart- Normal Rate with Regular rhythm and no murmurs. There are no gallops. There are no rubs. In the lower extremities there is no edema. The upper extremities do not have edema.    Abdomen: Soft, benign, non-tender  with no masses, hernias, organomegaly or scars.    Impression and Assessment    Hypothyroidism.    Depression with Anxiety.    Hyperglycemia.    Plan    Hypothyroidism Plan: The patient was instructed to continue the current medications.    Hyperglycemia Plan: Encouraged to eat a low added sugar diet.    Depression with Anxiety Plan: The patient was instructed to continue the current medications.    Diagnoses and all orders for this visit:    1. Hypothyroidism, unspecified type (Primary)  -     T4, Free; Future  -     TSH; Future  -     T4, Free  -     TSH    2. Depression with anxiety    3. Blood glucose elevated  -     Comprehensive Metabolic Panel; Future  -     Hemoglobin A1c; Future  -     Comprehensive Metabolic Panel  -     Hemoglobin A1c    Class 2 Severe Obesity (BMI >=35 and <=39.9). Obesity-related health conditions include the following: none. Obesity is unchanged. BMI is is above average; BMI management plan is completed. We discussed low calorie, low carb based diet program, portion control, increasing exercise, and Information on healthy weight added to patient's after visit summary.      Return to Office    The patient was instructed to return for follow-up in 6 months. The patient was instructed to return sooner if the condition changes, worsens, or does not resolve.

## 2024-09-25 ENCOUNTER — TELEMEDICINE (OUTPATIENT)
Dept: INTERNAL MEDICINE | Facility: CLINIC | Age: 64
End: 2024-09-25
Payer: COMMERCIAL

## 2024-09-25 VITALS — TEMPERATURE: 99.3 F | HEART RATE: 71 BPM | OXYGEN SATURATION: 97 % | BODY MASS INDEX: 38.41 KG/M2 | WEIGHT: 210 LBS

## 2024-09-25 DIAGNOSIS — R05.1 ACUTE COUGH: ICD-10-CM

## 2024-09-25 DIAGNOSIS — U07.1 COVID-19 VIRUS INFECTION: Primary | ICD-10-CM

## 2024-09-25 PROCEDURE — 99213 OFFICE O/P EST LOW 20 MIN: CPT | Performed by: INTERNAL MEDICINE

## 2024-09-25 RX ORDER — BENZONATATE 100 MG/1
100 CAPSULE ORAL 3 TIMES DAILY PRN
Qty: 30 CAPSULE | Refills: 1 | Status: SHIPPED | OUTPATIENT
Start: 2024-09-25

## 2024-09-25 RX ORDER — BETAHISTINE HCL 100 %
POWDER (GRAM) MISCELLANEOUS
COMMUNITY
Start: 2024-04-01

## 2024-10-16 ENCOUNTER — TELEPHONE (OUTPATIENT)
Dept: INTERNAL MEDICINE | Facility: CLINIC | Age: 64
End: 2024-10-16
Payer: COMMERCIAL

## 2024-10-16 ENCOUNTER — TELEPHONE (OUTPATIENT)
Dept: INTERNAL MEDICINE | Facility: CLINIC | Age: 64
End: 2024-10-16

## 2024-10-16 ENCOUNTER — LAB (OUTPATIENT)
Dept: INTERNAL MEDICINE | Facility: CLINIC | Age: 64
End: 2024-10-16
Payer: COMMERCIAL

## 2024-10-16 DIAGNOSIS — R30.0 DYSURIA: ICD-10-CM

## 2024-10-16 DIAGNOSIS — N30.00 ACUTE CYSTITIS WITHOUT HEMATURIA: Primary | ICD-10-CM

## 2024-10-16 DIAGNOSIS — R30.0 DYSURIA: Primary | ICD-10-CM

## 2024-10-16 LAB
BILIRUB BLD-MCNC: NEGATIVE MG/DL
EXPIRATION DATE: ABNORMAL
GLUCOSE UR STRIP-MCNC: NEGATIVE MG/DL
KETONES UR QL: NEGATIVE
LEUKOCYTE EST, POC: ABNORMAL
Lab: ABNORMAL
NITRITE UR-MCNC: NEGATIVE MG/ML
PH UR: 5 [PH] (ref 5–8)
PROT UR STRIP-MCNC: ABNORMAL MG/DL
RBC # UR STRIP: ABNORMAL /UL
SP GR UR: 1.02 (ref 1–1.03)
UROBILINOGEN UR QL: NORMAL

## 2024-10-16 PROCEDURE — 87186 SC STD MICRODIL/AGAR DIL: CPT | Performed by: INTERNAL MEDICINE

## 2024-10-16 PROCEDURE — 81003 URINALYSIS AUTO W/O SCOPE: CPT | Performed by: INTERNAL MEDICINE

## 2024-10-16 PROCEDURE — 87086 URINE CULTURE/COLONY COUNT: CPT | Performed by: INTERNAL MEDICINE

## 2024-10-16 PROCEDURE — 87077 CULTURE AEROBIC IDENTIFY: CPT | Performed by: INTERNAL MEDICINE

## 2024-10-16 RX ORDER — SULFAMETHOXAZOLE/TRIMETHOPRIM 800-160 MG
1 TABLET ORAL 2 TIMES DAILY
Qty: 20 TABLET | Refills: 0 | Status: SHIPPED | OUTPATIENT
Start: 2024-10-16

## 2024-10-16 NOTE — TELEPHONE ENCOUNTER
She does have an infection.  I have sent in bactrim DS 1 po BID x 10 days.  The culture will be back in 48 hours and I will let her know the final results and if any change is required.

## 2024-10-16 NOTE — TELEPHONE ENCOUNTER
Caller: Billie Foster    Relationship: Self    Best call back number: [phone]       What test was performed: URINE ANALYSIS     When was the test performed: 10/15    Where was the test performed: IN OFFICE    Additional notes: PLEASE CALL PATIENT WHEN RESULTS HAVE BEEN RECEIVED AND REVIEWED. IF MEDICATION IS NEEDED FOR TREATMENT PLEASE SEND TO [facility] [address] [phone] [facility] [phone]  [phone]   IF UNABLE TO REACH PATIENT PLEASE LEAVE MESSAGE IN [employer].

## 2024-10-16 NOTE — TELEPHONE ENCOUNTER
"Spoke with patient, informed that Dr Booth said \"She does have an infection. I have sent in bactrim DS 1 po BID x 10 days. The culture will be back in 48 hours and I will let her know the final results and if any change is required.\"  Verbalized good understanding, agreement and great appreciation.    "

## 2024-10-16 NOTE — TELEPHONE ENCOUNTER
Caller: Billie Foster    Relationship: Self    Best call back number: [phone]     What medication are you requesting: AMOXICILLIN-CLAV    What are your current symptoms: PRESSURE, FREQUENCY, PAIN TO URINATE    How long have you been experiencing symptoms: 2 WEEKS    Have you had these symptoms before:    [x] Yes  [] No    Have you been treated for these symptoms before:   [x] Yes  [] No    If a prescription is needed, what is your preferred pharmacy and phone number: [facility] - [place], [place] - [address] [place] [address] - [phone] [facility] [phone] FX

## 2024-10-16 NOTE — TELEPHONE ENCOUNTER
Caller: Billie Foster    Relationship: Self    Best call back number: [phone]     What orders are you requesting (i.e. lab or imaging): URINALYSIS    In what timeframe would the patient need to come in: AS SOON AS POSSIBLE 10/16/24    Where will you receive your lab/imaging services: IN OFFICE    Additional notes: THE PATIENT'S SISTER HAS AN APPOINTMENT AT 1030, THE PATIENT WOULD  LIKE TO RIDE IN WITH HER TO GET THIS DONE.

## 2024-10-19 LAB — BACTERIA SPEC AEROBE CULT: ABNORMAL

## 2024-12-09 DIAGNOSIS — F41.8 DEPRESSION WITH ANXIETY: ICD-10-CM

## 2025-01-21 ENCOUNTER — TRANSCRIBE ORDERS (OUTPATIENT)
Dept: ADMINISTRATIVE | Facility: HOSPITAL | Age: 65
End: 2025-01-21
Payer: COMMERCIAL

## 2025-01-21 DIAGNOSIS — Z12.31 OTHER SCREENING MAMMOGRAM: Primary | ICD-10-CM

## 2025-01-29 ENCOUNTER — HOSPITAL ENCOUNTER (OUTPATIENT)
Dept: MAMMOGRAPHY | Facility: HOSPITAL | Age: 65
Discharge: HOME | End: 2025-01-29
Admitting: INTERNAL MEDICINE
Payer: COMMERCIAL

## 2025-01-29 DIAGNOSIS — Z12.31 OTHER SCREENING MAMMOGRAM: ICD-10-CM

## 2025-01-29 LAB
NCCN CRITERIA FLAG: NORMAL
TYRER CUZICK SCORE: 5.6

## 2025-01-29 PROCEDURE — 77067 SCR MAMMO BI INCL CAD: CPT

## 2025-01-29 PROCEDURE — 77063 BREAST TOMOSYNTHESIS BI: CPT

## 2025-02-20 ENCOUNTER — OFFICE VISIT (OUTPATIENT)
Dept: INTERNAL MEDICINE | Facility: CLINIC | Age: 65
End: 2025-02-20
Payer: COMMERCIAL

## 2025-02-20 VITALS
DIASTOLIC BLOOD PRESSURE: 62 MMHG | SYSTOLIC BLOOD PRESSURE: 124 MMHG | TEMPERATURE: 97.8 F | WEIGHT: 198 LBS | RESPIRATION RATE: 18 BRPM | HEART RATE: 68 BPM | BODY MASS INDEX: 36.44 KG/M2 | HEIGHT: 62 IN

## 2025-02-20 DIAGNOSIS — F41.8 DEPRESSION WITH ANXIETY: ICD-10-CM

## 2025-02-20 DIAGNOSIS — Z00.00 PHYSICAL EXAM: Primary | ICD-10-CM

## 2025-02-20 DIAGNOSIS — Z13.6 SCREENING FOR CARDIOVASCULAR CONDITION: ICD-10-CM

## 2025-02-20 DIAGNOSIS — R73.02 IMPAIRED GLUCOSE TOLERANCE: ICD-10-CM

## 2025-02-20 DIAGNOSIS — E03.9 HYPOTHYROIDISM, UNSPECIFIED TYPE: ICD-10-CM

## 2025-02-20 LAB
ALBUMIN SERPL-MCNC: 4.4 G/DL (ref 3.5–5.2)
ALBUMIN/CREATININE RATIO, URINE: ABNORMAL
ALBUMIN/GLOB SERPL: 1.6 G/DL
ALP SERPL-CCNC: 74 U/L (ref 39–117)
ALT SERPL W P-5'-P-CCNC: 17 U/L (ref 1–33)
ANION GAP SERPL CALCULATED.3IONS-SCNC: 14 MMOL/L (ref 5–15)
AST SERPL-CCNC: 28 U/L (ref 1–32)
BASOPHILS # BLD AUTO: 0.03 10*3/MM3 (ref 0–0.2)
BASOPHILS NFR BLD AUTO: 0.7 % (ref 0–1.5)
BILIRUB BLD-MCNC: NEGATIVE MG/DL
BILIRUB SERPL-MCNC: 0.4 MG/DL (ref 0–1.2)
BUN SERPL-MCNC: 14 MG/DL (ref 8–23)
BUN/CREAT SERPL: 17.5 (ref 7–25)
CALCIUM SPEC-SCNC: 9.5 MG/DL (ref 8.6–10.5)
CHLORIDE SERPL-SCNC: 102 MMOL/L (ref 98–107)
CHOLEST SERPL-MCNC: 243 MG/DL (ref 0–200)
CLARITY, POC: CLEAR
CO2 SERPL-SCNC: 22 MMOL/L (ref 22–29)
COLOR UR: YELLOW
CREAT SERPL-MCNC: 0.8 MG/DL (ref 0.57–1)
DEPRECATED RDW RBC AUTO: 42.6 FL (ref 37–54)
EGFRCR SERPLBLD CKD-EPI 2021: 82.4 ML/MIN/1.73
EOSINOPHIL # BLD AUTO: 0.06 10*3/MM3 (ref 0–0.4)
EOSINOPHIL NFR BLD AUTO: 1.3 % (ref 0.3–6.2)
ERYTHROCYTE [DISTWIDTH] IN BLOOD BY AUTOMATED COUNT: 13.5 % (ref 12.3–15.4)
EXPIRATION DATE: ABNORMAL
EXPIRATION DATE: ABNORMAL
GLOBULIN UR ELPH-MCNC: 2.8 GM/DL
GLUCOSE SERPL-MCNC: 71 MG/DL (ref 65–99)
GLUCOSE UR STRIP-MCNC: NEGATIVE MG/DL
HBA1C MFR BLD: 5.4 % (ref 4.8–5.6)
HCT VFR BLD AUTO: 41.3 % (ref 34–46.6)
HCV AB SER QL: NORMAL
HDLC SERPL-MCNC: 66 MG/DL (ref 40–60)
HGB BLD-MCNC: 14.1 G/DL (ref 12–15.9)
IMM GRANULOCYTES # BLD AUTO: 0.01 10*3/MM3 (ref 0–0.05)
IMM GRANULOCYTES NFR BLD AUTO: 0.2 % (ref 0–0.5)
KETONES UR QL: NEGATIVE
LDLC SERPL CALC-MCNC: 165 MG/DL (ref 0–100)
LDLC/HDLC SERPL: 2.46 {RATIO}
LEUKOCYTE EST, POC: NEGATIVE
LYMPHOCYTES # BLD AUTO: 1.41 10*3/MM3 (ref 0.7–3.1)
LYMPHOCYTES NFR BLD AUTO: 30.7 % (ref 19.6–45.3)
Lab: ABNORMAL
Lab: ABNORMAL
MCH RBC QN AUTO: 29.6 PG (ref 26.6–33)
MCHC RBC AUTO-ENTMCNC: 34.1 G/DL (ref 31.5–35.7)
MCV RBC AUTO: 86.8 FL (ref 79–97)
MONOCYTES # BLD AUTO: 0.44 10*3/MM3 (ref 0.1–0.9)
MONOCYTES NFR BLD AUTO: 9.6 % (ref 5–12)
NEUTROPHILS NFR BLD AUTO: 2.65 10*3/MM3 (ref 1.7–7)
NEUTROPHILS NFR BLD AUTO: 57.5 % (ref 42.7–76)
NITRITE UR-MCNC: NEGATIVE MG/ML
NRBC BLD AUTO-RTO: 0 /100 WBC (ref 0–0.2)
PH UR: 7 [PH] (ref 5–8)
PLATELET # BLD AUTO: 245 10*3/MM3 (ref 140–450)
PMV BLD AUTO: 10.9 FL (ref 6–12)
POC CREATININE URINE: 300
POC MICROALBUMIN URINE: 80
POTASSIUM SERPL-SCNC: 3.9 MMOL/L (ref 3.5–5.2)
PROT SERPL-MCNC: 7.2 G/DL (ref 6–8.5)
PROT UR STRIP-MCNC: NEGATIVE MG/DL
RBC # BLD AUTO: 4.76 10*6/MM3 (ref 3.77–5.28)
RBC # UR STRIP: NEGATIVE /UL
SODIUM SERPL-SCNC: 138 MMOL/L (ref 136–145)
SP GR UR: 1.01 (ref 1–1.03)
T4 FREE SERPL-MCNC: 1.52 NG/DL (ref 0.92–1.68)
TRIGL SERPL-MCNC: 72 MG/DL (ref 0–150)
TSH SERPL DL<=0.05 MIU/L-ACNC: 2.89 UIU/ML (ref 0.27–4.2)
UROBILINOGEN UR QL: ABNORMAL
VLDLC SERPL-MCNC: 12 MG/DL (ref 5–40)
WBC NRBC COR # BLD AUTO: 4.6 10*3/MM3 (ref 3.4–10.8)

## 2025-02-20 PROCEDURE — 86735 MUMPS ANTIBODY: CPT | Performed by: INTERNAL MEDICINE

## 2025-02-20 PROCEDURE — 86765 RUBEOLA ANTIBODY: CPT | Performed by: INTERNAL MEDICINE

## 2025-02-20 PROCEDURE — 81003 URINALYSIS AUTO W/O SCOPE: CPT | Performed by: INTERNAL MEDICINE

## 2025-02-20 PROCEDURE — 99214 OFFICE O/P EST MOD 30 MIN: CPT | Performed by: INTERNAL MEDICINE

## 2025-02-20 PROCEDURE — 86762 RUBELLA ANTIBODY: CPT | Performed by: INTERNAL MEDICINE

## 2025-02-20 PROCEDURE — 86803 HEPATITIS C AB TEST: CPT | Performed by: INTERNAL MEDICINE

## 2025-02-20 PROCEDURE — 84439 ASSAY OF FREE THYROXINE: CPT | Performed by: INTERNAL MEDICINE

## 2025-02-20 PROCEDURE — 80050 GENERAL HEALTH PANEL: CPT | Performed by: INTERNAL MEDICINE

## 2025-02-20 PROCEDURE — 99396 PREV VISIT EST AGE 40-64: CPT | Performed by: INTERNAL MEDICINE

## 2025-02-20 PROCEDURE — 80061 LIPID PANEL: CPT | Performed by: INTERNAL MEDICINE

## 2025-02-20 PROCEDURE — 82044 UR ALBUMIN SEMIQUANTITATIVE: CPT | Performed by: INTERNAL MEDICINE

## 2025-02-20 PROCEDURE — 83036 HEMOGLOBIN GLYCOSYLATED A1C: CPT | Performed by: INTERNAL MEDICINE

## 2025-02-20 RX ORDER — ESTRADIOL 0.04 MG/D
1 PATCH, EXTENDED RELEASE TRANSDERMAL 2 TIMES WEEKLY
COMMUNITY
Start: 2025-02-14

## 2025-02-20 RX ORDER — MECLIZINE HCL 25MG 25 MG/1
25 TABLET, CHEWABLE ORAL 3 TIMES DAILY PRN
COMMUNITY

## 2025-02-20 RX ORDER — SERTRALINE HYDROCHLORIDE 100 MG/1
100 TABLET, FILM COATED ORAL DAILY
Qty: 30 TABLET | Refills: 5 | Status: SHIPPED | OUTPATIENT
Start: 2025-02-20

## 2025-02-20 RX ORDER — PROGESTERONE 100 MG/1
100 CAPSULE ORAL DAILY
COMMUNITY
Start: 2024-11-14

## 2025-02-20 RX ORDER — ATOGEPANT 60 MG/1
60 TABLET ORAL DAILY
COMMUNITY
Start: 2025-02-10

## 2025-02-20 RX ORDER — FREMANEZUMAB-VFRM 225 MG/1.5ML
INJECTION SUBCUTANEOUS
COMMUNITY
Start: 2025-01-30

## 2025-02-20 NOTE — PROGRESS NOTES
Chief Complaint   Patient presents with    Annual Exam       History of Present Illness   The patient presents for an established patient physical examination.     The patient presents for a follow-up related to depression with anxiety. Her energy level is good. She denies agorophobia. She sleeps well. She has been on a medication in the past, but is not currently on a medication. The medication regimen consisted of Sertraline. The patient denies having medication side effects including nausea, headaches, anxiety, increased depression or fatigue.    The patient presents for a follow-up related to hypothyroidism. She reports a good energy level. She reports no hair loss, heat intolerance, cold intolerance, diarrhea, constipation, sweats or palpitations. She is taking her medication levothyroxine as prescribed.     The patient presents for a follow-up related to impaired glucose tolerance. She denies hypoglycemic symptoms. The patient denies polyuria, polydypsia or polyphagia. She reports no symptoms of neuropathy. The patient is not on medication for her hyperglycemia. The patient does not check her feet daily at home. She denies chest pain, shortness of breath, orthopnea, paroxysmal nocturnal dyspnea, dyspnea on exertion, edema or syncope. Patient is following a low fat diet and exercising reguarly. She states she has lost 10 lbs in the last 6 months.    Patient presents with vertigo symptoms. Admits to dizziness, nausea, and vomiting. Patients has a history of benign paroxysmal positional vertigo. Denies history of falls. Patient sees neurology for this and is controlled on medications.      Medications      Current Outpatient Medications:     Ajovy 225 MG/1.5ML solution auto-injector, Every 30 (Thirty) Days., Disp: , Rfl:     Atogepant (Qulipta) 60 MG tablet, Take 1 tablet by mouth Daily., Disp: , Rfl:     benzonatate (Tessalon Perles) 100 MG capsule, Take 1 capsule by mouth 3 (Three) Times a Day As Needed for  "Cough., Disp: 30 capsule, Rfl: 1    Betahistine HCl powder, , Disp: , Rfl:     diazePAM (VALIUM) 5 MG tablet, TAKE 1 TABLET BY MOUTH EVERY 8 HOURS AS NEEDED FOR ANXIETY/VERTIGO, Disp: , Rfl:     dimenhyDRINATE (DRAMAMINE) 50 MG tablet, Take 1 tablet by mouth 4 (Four) Times a Day As Needed for Movement Disorders., Disp: 30 tablet, Rfl: 0    estradiol (ESTRACE) 0.1 MG/GM vaginal cream, Insert 2 g into the vagina 2 (Two) Times a Week., Disp: , Rfl:     estradiol (VIVELLE-DOT) 0.0375 MG/24HR patch, Place 1 patch on the skin as directed by provider 2 (Two) Times a Week., Disp: , Rfl:     levothyroxine (SYNTHROID, LEVOTHROID) 50 MCG tablet, Take 1 tablet by mouth Every Morning Before Breakfast., Disp: 90 tablet, Rfl: 1    meclizine 25 MG chewable tablet chewable tablet, Chew 1 tablet 3 (Three) Times a Day As Needed., Disp: , Rfl:     ondansetron ODT (ZOFRAN ODT) 4 MG disintegrating tablet, Take 1 tablet by mouth Every 8 (Eight) Hours As Needed for Nausea or Vomiting., Disp: 20 tablet, Rfl: 0    Progesterone (PROMETRIUM) 100 MG capsule, Take 1 capsule by mouth Daily., Disp: , Rfl:     promethazine (PHENERGAN) 12.5 MG tablet, Take 1 tablet by mouth Every 6 (Six) Hours As Needed for Nausea or Vomiting., Disp: 20 tablet, Rfl: 0    sertraline (ZOLOFT) 50 MG tablet, TAKE ONE TABLET BY MOUTH ONCE DAILY, Disp: 30 tablet, Rfl: 5     Allergies    No Known Allergies    Problem List    Patient Active Problem List   Diagnosis    Other specified hypothyroidism    Anxiety    Benign paroxysmal positional vertigo due to bilateral vestibular disorder    Rash in adult       Medications, Allergies, Problems List and Past History were reviewed and updated.    Physical Examination    /62 (BP Location: Left arm, Patient Position: Sitting, Cuff Size: Adult)   Pulse 68   Temp 97.8 °F (36.6 °C) (Infrared)   Resp 18   Ht 156.8 cm (61.75\")   Wt 89.8 kg (198 lb)   BMI 36.51 kg/m²    Physical Exam  Constitutional:       General: She is not " in acute distress.     Appearance: Normal appearance. She is not ill-appearing, toxic-appearing or diaphoretic.   HENT:      Head: Normocephalic and atraumatic.      Right Ear: Tympanic membrane, ear canal and external ear normal. There is no impacted cerumen.      Left Ear: External ear normal. There is no impacted cerumen.      Nose: Nose normal. No congestion or rhinorrhea.      Mouth/Throat:      Mouth: Mucous membranes are moist.      Pharynx: Oropharynx is clear. No oropharyngeal exudate or posterior oropharyngeal erythema.   Eyes:      General: No scleral icterus.        Right eye: No discharge.         Left eye: No discharge.      Extraocular Movements: Extraocular movements intact.      Conjunctiva/sclera: Conjunctivae normal.      Pupils: Pupils are equal, round, and reactive to light.   Neck:      Vascular: No carotid bruit.   Cardiovascular:      Rate and Rhythm: Normal rate and regular rhythm.      Pulses: Normal pulses.      Heart sounds: Normal heart sounds. No murmur heard.     No friction rub. No gallop.   Pulmonary:      Effort: Pulmonary effort is normal. No respiratory distress.      Breath sounds: Normal breath sounds. No stridor. No wheezing, rhonchi or rales.   Chest:      Chest wall: No tenderness.   Abdominal:      General: Abdomen is flat. Bowel sounds are normal. There is no distension.      Palpations: Abdomen is soft. There is no mass.      Tenderness: There is no abdominal tenderness. There is no right CVA tenderness, left CVA tenderness, guarding or rebound.      Hernia: No hernia is present.   Musculoskeletal:         General: No swelling, tenderness, deformity or signs of injury. Normal range of motion.      Cervical back: Normal range of motion and neck supple. No rigidity or tenderness.      Right lower leg: No edema.      Left lower leg: No edema.   Lymphadenopathy:      Cervical: No cervical adenopathy.   Skin:     General: Skin is warm.      Coloration: Skin is not jaundiced or  pale.      Findings: No bruising, erythema, lesion or rash.   Neurological:      General: No focal deficit present.      Mental Status: She is alert.      Cranial Nerves: No cranial nerve deficit.      Sensory: No sensory deficit.      Motor: No weakness.      Coordination: Coordination normal.      Gait: Gait normal.   Psychiatric:         Mood and Affect: Mood normal.         Behavior: Behavior normal.         Thought Content: Thought content normal.         Judgment: Judgment normal.          Diagnoses and all orders for this visit:    1. Physical exam (Primary)  -     CBC & Differential; Future  -     POC Urinalysis Dipstick, Automated; Future  -     Hepatitis C Antibody; Future    2. Depression with anxiety    3. Hypothyroidism, unspecified type  -     TSH; Future  -     T4, Free; Future    4. Impaired glucose tolerance  -     Comprehensive Metabolic Panel; Future  -     Hemoglobin A1c; Future  -     POC Microalbumin; Future    5. Screening for cardiovascular condition  -     Lipid Panel; Future    Continue on prescribed medications. Follow up with neurologist. Present for follow up in 4-6 months, sooner if symptoms worsen or persist.    Attending Note:   Patient was personally seen and examined by me.  I have obtained and corroborated the history and examination as documented above, and agree with the accuracy of the documented information.  All orders were reviewed and personally signed by me.   Ajrun Booth MD  10:23 EST  02/20/25

## 2025-02-21 LAB
MEV IGG SER IA-ACNC: >300 AU/ML
MUV IGG SER IA-ACNC: 211 AU/ML
RUBV IGG SERPL IA-ACNC: 8.1 INDEX

## 2025-04-02 DIAGNOSIS — E03.9 HYPOTHYROIDISM, UNSPECIFIED TYPE: ICD-10-CM

## 2025-04-02 RX ORDER — LEVOTHYROXINE SODIUM 50 UG/1
50 TABLET ORAL
Qty: 90 TABLET | Refills: 1 | Status: SHIPPED | OUTPATIENT
Start: 2025-04-02

## 2025-05-27 ENCOUNTER — OFFICE VISIT (OUTPATIENT)
Dept: INTERNAL MEDICINE | Facility: CLINIC | Age: 65
End: 2025-05-27
Payer: COMMERCIAL

## 2025-05-27 VITALS
RESPIRATION RATE: 16 BRPM | WEIGHT: 190 LBS | TEMPERATURE: 98 F | DIASTOLIC BLOOD PRESSURE: 68 MMHG | HEART RATE: 64 BPM | BODY MASS INDEX: 35.03 KG/M2 | SYSTOLIC BLOOD PRESSURE: 126 MMHG

## 2025-05-27 DIAGNOSIS — F41.8 DEPRESSION WITH ANXIETY: Primary | ICD-10-CM

## 2025-05-27 PROCEDURE — 99213 OFFICE O/P EST LOW 20 MIN: CPT | Performed by: INTERNAL MEDICINE

## 2025-05-27 RX ORDER — BUPROPION HYDROCHLORIDE 150 MG/1
150 TABLET ORAL DAILY
Qty: 30 TABLET | Refills: 2 | Status: SHIPPED | OUTPATIENT
Start: 2025-05-27

## 2025-05-27 NOTE — PROGRESS NOTES
Chief Complaint   Patient presents with    Follow-up     3 month follow up chronic medical problems       History of Present Illness    The patient presents for a follow-up related to depression. She reports currently having depression symptoms. She denies suicidal ideation. Her energy level is good. She denies agorophobia. She sleeps well. She is not tearful. She states that her current depression symptoms are stable. She is currently taking a medication. The current medication regimen consists of sertraline. The patient reports side effect consisting of fatigue but denies nausea, headaches, anxiety, increased depression or anorgasmia.    Medications      Current Outpatient Medications:     Ajovy 225 MG/1.5ML solution auto-injector, Every 30 (Thirty) Days., Disp: , Rfl:     Atogepant (Qulipta) 60 MG tablet, Take 1 tablet by mouth Daily., Disp: , Rfl:     benzonatate (Tessalon Perles) 100 MG capsule, Take 1 capsule by mouth 3 (Three) Times a Day As Needed for Cough., Disp: 30 capsule, Rfl: 1    Betahistine HCl powder, , Disp: , Rfl:     diazePAM (VALIUM) 5 MG tablet, TAKE 1 TABLET BY MOUTH EVERY 8 HOURS AS NEEDED FOR ANXIETY/VERTIGO, Disp: , Rfl:     dimenhyDRINATE (DRAMAMINE) 50 MG tablet, Take 1 tablet by mouth 4 (Four) Times a Day As Needed for Movement Disorders., Disp: 30 tablet, Rfl: 0    estradiol (ESTRACE) 0.1 MG/GM vaginal cream, Insert 2 g into the vagina 2 (Two) Times a Week., Disp: , Rfl:     estradiol (VIVELLE-DOT) 0.0375 MG/24HR patch, Place 1 patch on the skin as directed by provider 2 (Two) Times a Week., Disp: , Rfl:     levothyroxine (SYNTHROID, LEVOTHROID) 50 MCG tablet, Take 1 tablet by mouth Every Morning Before Breakfast., Disp: 90 tablet, Rfl: 1    meclizine 25 MG chewable tablet chewable tablet, Chew 1 tablet 3 (Three) Times a Day As Needed., Disp: , Rfl:     ondansetron ODT (ZOFRAN ODT) 4 MG disintegrating tablet, Take 1 tablet by mouth Every 8 (Eight) Hours As Needed for Nausea or  Vomiting., Disp: 20 tablet, Rfl: 0    Progesterone (PROMETRIUM) 100 MG capsule, Take 1 capsule by mouth Daily., Disp: , Rfl:     promethazine (PHENERGAN) 12.5 MG tablet, Take 1 tablet by mouth Every 6 (Six) Hours As Needed for Nausea or Vomiting., Disp: 20 tablet, Rfl: 0    sertraline (Zoloft) 100 MG tablet, Take 1 tablet by mouth Daily., Disp: 30 tablet, Rfl: 5    Allergies    No Known Allergies    Problem List    Patient Active Problem List   Diagnosis    Other specified hypothyroidism    Anxiety    Benign paroxysmal positional vertigo due to bilateral vestibular disorder    Rash in adult       Medications, Allergies, Problems List and Past History were reviewed and updated.    Physical Examination    /68 (BP Location: Right arm, Patient Position: Sitting, Cuff Size: Adult)   Pulse 64   Temp 98 °F (36.7 °C) (Infrared)   Resp 16   Wt 86.2 kg (190 lb)   BMI 35.03 kg/m²       Psychiatric Examination: The patient appears appropriate, clean and tidy with a level of consciousness that is appropriate and alert. The facial expression is appropriate and she makes good eye contact. The patient is talkative and the speech volume is appropriate. The words are articulated clearly with a normal flow. There are no circumlocutions and the patient does not paraphrase.       The mood is appropriate. There are no abnormal thought processes and the thought content is normal. There are no delusions or hallucinations noted.    Impression and Assessment    Reactive Depression.    Plan    Reactive Depression Plan: The medications were adjusted as noted.      Diagnoses and all orders for this visit:    1. Depression with anxiety (Primary)  -     buPROPion XL (Wellbutrin XL) 150 MG 24 hr tablet; Take 1 tablet by mouth Daily.  Dispense: 30 tablet; Refill: 2          Return to Office    The patient was instructed to return for follow-up in 1 month. The patient was instructed to return sooner if the condition changes, worsens, or  does not resolve.  Answers submitted by the patient for this visit:  Problem not listed (Submitted on 5/26/2025)  Chief Complaint: Other medical problem  Reason for appointment: Follow up for Sertraline increase  headaches: Yes  vertigo: Yes  Other symptom: imbalance/falls  Onset: 6 to 12 months  Chronicity: recurrent  Frequency: weekly  Additional information: Meneire's Disease

## 2025-06-02 ENCOUNTER — OFFICE VISIT (OUTPATIENT)
Dept: OBSTETRICS AND GYNECOLOGY | Facility: CLINIC | Age: 65
End: 2025-06-02
Payer: MEDICARE

## 2025-06-02 VITALS
SYSTOLIC BLOOD PRESSURE: 110 MMHG | BODY MASS INDEX: 34.96 KG/M2 | HEIGHT: 62 IN | DIASTOLIC BLOOD PRESSURE: 70 MMHG | WEIGHT: 190 LBS

## 2025-06-02 DIAGNOSIS — Z79.890 HORMONE REPLACEMENT THERAPY (POSTMENOPAUSAL): ICD-10-CM

## 2025-06-02 DIAGNOSIS — N95.8 GENITOURINARY SYNDROME OF MENOPAUSE: ICD-10-CM

## 2025-06-02 DIAGNOSIS — N39.3 URINARY, INCONTINENCE, STRESS FEMALE: ICD-10-CM

## 2025-06-02 DIAGNOSIS — Z80.0 FAMILY HISTORY OF COLON CANCER: ICD-10-CM

## 2025-06-02 DIAGNOSIS — Z80.0 FAMILY HISTORY OF PANCREATIC CANCER: ICD-10-CM

## 2025-06-02 DIAGNOSIS — Z01.419 WELL WOMAN EXAM WITH ROUTINE GYNECOLOGICAL EXAM: Primary | ICD-10-CM

## 2025-06-02 LAB
BILIRUB UR QL STRIP: NEGATIVE
CLARITY UR: ABNORMAL
COLOR UR: ABNORMAL
GLUCOSE UR STRIP-MCNC: NEGATIVE MG/DL
HGB UR QL STRIP.AUTO: NEGATIVE
HOLD SPECIMEN: NORMAL
KETONES UR QL STRIP: ABNORMAL
LEUKOCYTE ESTERASE UR QL STRIP.AUTO: NEGATIVE
NITRITE UR QL STRIP: NEGATIVE
PH UR STRIP.AUTO: 7 [PH] (ref 5–8)
PROT UR QL STRIP: ABNORMAL
SP GR UR STRIP: 1.03 (ref 1–1.03)
UROBILINOGEN UR QL STRIP: ABNORMAL

## 2025-06-02 PROCEDURE — 1159F MED LIST DOCD IN RCRD: CPT | Performed by: OBSTETRICS & GYNECOLOGY

## 2025-06-02 PROCEDURE — 99386 PREV VISIT NEW AGE 40-64: CPT | Performed by: OBSTETRICS & GYNECOLOGY

## 2025-06-02 PROCEDURE — 1160F RVW MEDS BY RX/DR IN RCRD: CPT | Performed by: OBSTETRICS & GYNECOLOGY

## 2025-06-02 PROCEDURE — 81003 URINALYSIS AUTO W/O SCOPE: CPT | Performed by: OBSTETRICS & GYNECOLOGY

## 2025-06-02 PROCEDURE — 2014F MENTAL STATUS ASSESS: CPT | Performed by: OBSTETRICS & GYNECOLOGY

## 2025-06-02 RX ORDER — ESTRADIOL 0.03 MG/D
1 FILM, EXTENDED RELEASE TRANSDERMAL 2 TIMES WEEKLY
Qty: 8 PATCH | Refills: 12 | Status: SHIPPED | OUTPATIENT
Start: 2025-06-02

## 2025-06-02 RX ORDER — ESTRADIOL 0.1 MG/G
1 CREAM VAGINAL 2 TIMES WEEKLY
Qty: 42.5 G | Refills: 3 | Status: SHIPPED | OUTPATIENT
Start: 2025-06-02

## 2025-06-02 RX ORDER — PROGESTERONE 100 MG/1
100 CAPSULE ORAL DAILY
Qty: 90 CAPSULE | Refills: 3 | Status: SHIPPED | OUTPATIENT
Start: 2025-06-02 | End: 2026-06-02

## 2025-06-02 RX ORDER — ESTRADIOL 0.1 MG/G
2 CREAM VAGINAL 2 TIMES WEEKLY
Qty: 42.5 G | Refills: 3 | Status: CANCELLED | OUTPATIENT
Start: 2025-06-02

## 2025-06-02 NOTE — PROGRESS NOTES
Subjective   Chief Complaint   Patient presents with    Annual Exam     No c/c     Billie Foster is a 64 y.o. year old  menopausal female presenting to be seen for her annual exam.  This past year she has been on hormone replacement therapy.  There has not been vaginal bleeding in the last 12 months. Her LMP was around . She has been on HRT for about ~ 10 years - she did take a break for about 2 years. Menopausal symptoms are not present.  She is on hormone replacement therapy that was previously started by prior gynecologist and without her provider but she would like for me to take it over.  She uses an estradiol patch and Prometrium orally.  She uses vaginal estrogen cream for vaginal atrophy.  She would like to decrease the systemic dose of the estrogen if possible.    SEXUAL Hx:  She is not currently sexually active.  In the past year there she has not been sexually active.    Condoms are not needed because she is not sexually active.  She would not like to be screened for STD's at today's exam.  [place] is painful: n/a  HEALTH Hx:  She exercises regularly: yes.  She wears her seat belt: yes.  She has concerns about domestic violence: no.  OTHER THINGS SHE WANTS TO DISCUSS TODAY:  Nothing else    The following portions of the patient's history were reviewed and updated as appropriate:problem list, current medications, allergies, past family history, past medical history, past social history, and past surgical history.    Social History    Tobacco Use      Smoking status: Never        Passive exposure: Never      Smokeless tobacco: Never      Review of Systems  Constitutional POS: nothing reported    NEG: anorexia or night sweats   Genitourinary POS: SUKHWINDER is present but it IS NOT effecting her ADL's    NEG: dysuria or hematuria      Gastointestinal POS: nothing reported    NEG: bloating, change in bowel habits, melena, or reflux symptoms   Integument POS: nothing reported    NEG: moles  "that are changing in size, shape, color or rashes   Breast POS: nothing reported    NEG: persistent breast lump, skin dimpling, or nipple discharge        Objective   /70 (BP Location: Left arm, Patient Position: Sitting, Cuff Size: Adult)   Ht 157.5 cm (62\")   Wt 86.2 kg (190 lb)   BMI 34.75 kg/m²     General:  well developed; well nourished  no acute distress  mentation appropriate   Skin:  No suspicious lesions seen   Thyroid: normal to inspection and palpation   Breasts:  Examined in supine position  Symmetric without masses or skin dimpling  Nipples normal without inversion, lesions or discharge  There are no palpable axillary nodes   Abdomen: soft, non-tender; no masses  no umbilical or inguinal hernias are present  no hepato-splenomegaly   Pelvis: Clinical staff was present for exam  External genitalia:  normal appearance of the external genitalia including Bartholin's and [place]'s glands.  :  urethral meatus normal; urethra normal:  Vaginal:  atrophic mucosal changes are present;  Cervix:  normal appearance.  Uterus:  normal size, shape and consistency. anteverted;  Adnexa:  normal bimanual exam of the adnexa.  Rectal:  digital rectal exam not performed; anus visually normal appearing.        Assessment   Normal GYN exam  HRT - currently doing well but would like to decrease estrogen patch dose if possible  SUKHWINDER not affecting ADLs  Family history of pancreatic and colon cancer   Genitourinary syndrome of menopause   She is up to date on all relevant gynecologic and colorectal screenings except PAP test     Plan   Pap was done today.  If she does not receive the results of the Pap within 2 weeks  time, she was instructed to call to find out the results.  I explained to Billie that the recommendations for Pap smear interval in a low risk patient's has lengthened to 3 years time.  I encouraged her to be seen yearly for a full physical exam including breast and pelvic exam even during the off years " when PAP's will not be performed.  She was encouraged to get yearly mammograms.  She should report any palpable breast lump(s) or skin changes regardless of mammographic findings.  I explained to Billie that notification regarding her mammogram results will come from the center performing the study.  Our office will not be routinely calling with mammogram results.  It is her responsibility to make sure that the results from the mammogram are communicated to her by the breast center.  If she has any questions about the results, she is welcome to call our office anytime.  Colon cancer screening UTD  Reviewed the importance of exercise 2-3 times per week with at least 20-30 minutes of cardio  DEXA UTD  Amb ref for genetic counseling   The importance of keeping all planned follow-up and taking all medications as prescribed was emphasized.  Follow up for annual exam in ~ one year    New Medications Ordered This Visit   Medications    Minivelle 0.025 MG/24HR patch     Sig: Place 1 patch on the skin as directed by provider 2 (Two) Times a Week.     Dispense:  8 patch     Refill:  12    Progesterone (PROMETRIUM) 100 MG capsule     Sig: Take 1 capsule by mouth Daily.     Dispense:  90 capsule     Refill:  3    estradiol (ESTRACE) 0.1 MG/GM vaginal cream     Sig: Insert 1 g into the vagina 2 (Two) Times a Week.     Dispense:  42.5 g     Refill:  3     Orders Placed This Encounter   Procedures    Urinalysis With Culture If Indicated - Urine, Clean Catch     Standing Status:   Future     Number of Occurrences:   1     Expected Date:   6/2/2025     Expiration Date:   9/2/2026     Release to patient:   Routine Release [[id number]]    Ambulatory Referral to Genetic Counseling/Testing     Referral Priority:   Routine     Referral Type:   Consultation     Referral Reason:   Specialty Services Required     Number of Visits Requested:   1            This note was electronically signed.    Margarita Rivas MD  June 2, 2025

## 2025-06-03 LAB — REF LAB TEST METHOD: NORMAL

## 2025-06-04 PROBLEM — Z01.419 WELL WOMAN EXAM: Status: ACTIVE | Noted: 2025-06-04

## 2025-06-06 ENCOUNTER — PATIENT ROUNDING (BHMG ONLY) (OUTPATIENT)
Dept: OBSTETRICS AND GYNECOLOGY | Facility: CLINIC | Age: 65
End: 2025-06-06
Payer: MEDICARE

## 2025-06-06 NOTE — PROGRESS NOTES
My name is Radha, clinical manager    I am with [facility]  [facility]  [address]  [place] [address] [phone]    I want to officially welcome you to our practice and ask about your recent visit.    Tell me about your visit with us.  What things went well?    We're always looking for ways to make our patients' experiences even better. Do you have recommendations on way we may improve?    Overall were you satisfied with your first visit to our practice?    I appreciate you taking the time to answer a few questions today. Is there anything else I can do for you?    Thank you, and have a great day.

## 2025-06-18 RX ORDER — ESTRADIOL 0.03 MG/D
1 FILM, EXTENDED RELEASE TRANSDERMAL 2 TIMES WEEKLY
Qty: 8 PATCH | Refills: 12 | Status: SHIPPED | OUTPATIENT
Start: 2025-06-19

## 2025-06-24 ENCOUNTER — TELEPHONE (OUTPATIENT)
Dept: GENETICS | Facility: HOSPITAL | Age: 65
End: 2025-06-24
Payer: MEDICARE

## 2025-06-27 ENCOUNTER — TRANSCRIBE ORDERS (OUTPATIENT)
Facility: HOSPITAL | Age: 65
End: 2025-06-27
Payer: MEDICARE

## 2025-06-27 ENCOUNTER — LAB (OUTPATIENT)
Facility: HOSPITAL | Age: 65
End: 2025-06-27

## 2025-06-27 ENCOUNTER — CLINICAL SUPPORT (OUTPATIENT)
Facility: HOSPITAL | Age: 65
End: 2025-06-27

## 2025-06-27 DIAGNOSIS — Z80.3 FAMILY HISTORY OF MALIGNANT NEOPLASM OF BREAST: ICD-10-CM

## 2025-06-27 DIAGNOSIS — Z13.79 GENETIC TESTING: Primary | ICD-10-CM

## 2025-06-27 DIAGNOSIS — Z80.0 FAMILY HISTORY OF MALIGNANT NEOPLASM OF GASTROINTESTINAL TRACT: ICD-10-CM

## 2025-06-27 DIAGNOSIS — Z80.8 FAMILY HISTORY OF SKIN CANCER: ICD-10-CM

## 2025-06-27 DIAGNOSIS — Z80.0 FAMILY HISTORY OF PANCREATIC CANCER: ICD-10-CM

## 2025-06-27 PROCEDURE — 96041 GENETIC COUNSELING SVC EA 30: CPT | Performed by: GENETIC COUNSELOR, MS

## 2025-06-27 NOTE — PROGRESS NOTES
Billie Foster, a 65 year old female, was referred for genetic counseling due to a family history of colon and pancreatic cancer. She was accompanied to the appointment by her sister Roz. She was 10 years old at menarche and had her first child at 23. Her current cancer screening includes annual mammograms and colonoscopies every 5 years. She does not report a personal history of colon polyps. Ms. Foster is post-menopausal and has been taking HRT for about ten years. She retains her uterus and ovaries. She was interested in discussing her risk for a hereditary cancer syndrome. Ms. Foster was interested in pursuing a multigene panel, and therefore the Multi-Cancer panel was ordered through [employer] which analyzes 70 genes associated with an increased cancer risk.      FAMILY HISTORY:   Mother:    Skin cancer, 75 (squamous cell)  Sister:     Skin cancer, 65 (squamous cell)  Mat. Aunt:    Gallbladder cancer, 75   Mat. 1st Cousin:   Lymphoma, 21   Mat. 1st Cousin:   Colon cancer, late 30s   Mat. 1st Cousin:   Throat cancer, 60s   Mat. Uncle:    Eye cancer (unknown type), 40s   Mat. Uncle:    Unknown type of cancer, 60s  Mat. Grandfather:   Lung cancer, 60s   Pat. Uncle:    Stomach cancer, 40s   Pat. Aunt:    Colon cancer, 60s   Pat. Aunt:    Possible ovarian cancer, 90s   Pat. 1st Cousin:  Pancreatic cancer, 61   Pat. 1st Cousin x 2:   Breast cancer dx. 30s-40s   Pat. 1st Cousin once removed:  Lymphoma, 20s     We do not have medical records confirming the diagnoses in Ms. Edwards's family.     RISK ASSESSMENT: Ms. Edwards's  family history of colon and pancreatic cancer led to concern regarding a hereditary cancer syndrome.  She meets NCCN guidelines criteria for Riddle syndrome gene testing based on her family history of a second degree relative diagnosed with stomach cancer before age 50 and another second degree relative with colon cancer. The standard approach to genetic testing is through a multigene panel. If  genetic testing is negative, Ms. Foster's management should be guided by family history.      GENETIC COUNSELING:  We reviewed the family history information in detail.  Cases of cancer follow three general patterns: sporadic, familial, and hereditary.  While most cancer is sporadic, some cases appear to occur in family clusters.  These cases are said to be familial and account for 10-20% of certain cancer cases.  Familial cases may be due to a combination of shared genes and environmental factors among family members.  In even fewer families (~10%), the cancer is said to be inherited, and the genes responsible for the cancer are known.       Family histories typical of hereditary cancer syndromes usually include multiple first- and second-degree relatives diagnosed with cancer types that define a syndrome.  These cases tend to be diagnosed at younger-than-expected ages and can be bilateral or multifocal.  The cancer in these families follows an autosomal dominant inheritance pattern, which indicates the likely presence of a mutation in a cancer susceptibility gene.  Children and siblings of an individual believed to carry this mutation have a 50% chance of inheriting that mutation, thereby inheriting the increased risk to develop cancer.  These mutations can be passed down from the maternal or the paternal lineage.     The most common form of hereditary colorectal cancer is Riddle syndrome.  Riddle syndrome is caused by mutations in mismatch repair genes, (MLH1, MSH2, MSH6, PMS2, and EPCAM).  The lifetime risk for colon cancer for individuals with Riddle syndrome can be as high as 60% if there is no intervention (i.e. removal of polyps detected on colonoscopy).  Other risks include gastric, urinary tract, small bowel, biliary tract, pancreas, and brain.  Females with Riddle syndrome also have an elevated risk for endometrial and ovarian cancer.     The standard approach to genetic testing is via a multigene panel.   Genes included on these panels have varying degrees of risk associated, and management and screening guidelines vary based on the specific gene.  Hereditary cancer syndromes can demonstrate incomplete penetrance and variable expression within families. There are genes that are evaluated that have been more recently described, and there may be less data regarding the risks and therefore may not have established management guidelines at this time. We discussed the possibility of results that are unexpected based on family history. Based on Ms. Foster's family history and her desire to get more information regarding her personal risks and risks for her family, she opted to pursue testing through a panel evaluating several other genes known to increase the risk for cancer.      GENETIC TESTING:  The risks, benefits and limitations of genetic testing and implications for clinical management following testing were reviewed. DNA test results can influence decisions regarding screening and prevention.  Genetic testing can have significant psychological implications for both individuals and families. Also discussed was the possibility of employment and insurance discrimination based on genetic test results and the federal and states laws that are in place to prevent this (JOE), as well as the limitations of these laws.       We discussed multigene panel testing, which would involve testing several genes associated with an increased cancer risk. The benefits and limitations of genetic testing were discussed and Ms. Foster decided to pursue testing of the genes via the panel. The implications of a positive or negative test result were discussed.  We also discussed the importance of testing on an affected relative.  In cases where an affected relative is not available for testing or not willing to pursue testing, it is appropriate to offer testing to an unaffected individual. We discussed the possibility that, in some cases,  genetic test results may be ambiguous due to the identification of a genetic variant of uncertain significance (VUS). These variants may or may not be associated with an increased cancer risk. With multigene panel testing, it is not uncommon for a VUS to be identified.  If a VUS is identified, testing family members is not recommended and screening recommendations are made based on the family history.  The laboratories that perform genetic testing work to reclassify the VUS and send out an amended report if and when a VUS is reclassified.  The majority of variant findings are ultimately reclassified to a negative result. Given her family history, a negative test result does not eliminate all cancer risk, although the risk would not be as high as it would with positive genetic testing.      PLAN:  Genetic testing via the Multi-Cancer panel through [employer] was ordered. A blood sample was collected today 6/27/25. Results are expected in 2-3 weeks and we will contact Ms. Foster  with her results once they are received. She can contact us at [phone] with any questions in the meantime.           Lucille Mcintyre MS, [facility]   Licensed Certified Genetic Counselor       Total time spent caring for the patient today was 50 minutes. This time includes chart review, time spent during the visit, and time spent after the visit on documentation and follow-up.

## 2025-07-08 ENCOUNTER — OFFICE VISIT (OUTPATIENT)
Dept: INTERNAL MEDICINE | Facility: CLINIC | Age: 65
End: 2025-07-08
Payer: MEDICARE

## 2025-07-08 VITALS
WEIGHT: 191 LBS | SYSTOLIC BLOOD PRESSURE: 124 MMHG | HEART RATE: 64 BPM | BODY MASS INDEX: 34.93 KG/M2 | TEMPERATURE: 98 F | DIASTOLIC BLOOD PRESSURE: 60 MMHG | RESPIRATION RATE: 18 BRPM

## 2025-07-08 DIAGNOSIS — F41.8 DEPRESSION WITH ANXIETY: Primary | ICD-10-CM

## 2025-07-08 PROCEDURE — 1126F AMNT PAIN NOTED NONE PRSNT: CPT | Performed by: INTERNAL MEDICINE

## 2025-07-08 PROCEDURE — 99213 OFFICE O/P EST LOW 20 MIN: CPT | Performed by: INTERNAL MEDICINE

## 2025-07-08 PROCEDURE — G2211 COMPLEX E/M VISIT ADD ON: HCPCS | Performed by: INTERNAL MEDICINE

## 2025-07-08 RX ORDER — BUPROPION HYDROCHLORIDE 300 MG/1
300 TABLET ORAL DAILY
Qty: 30 TABLET | Refills: 2 | Status: SHIPPED | OUTPATIENT
Start: 2025-07-08

## 2025-07-08 NOTE — PROGRESS NOTES
Chief Complaint   Patient presents with    Follow-up     1 month follow up depression and anxiety       History of Present Illness      The patient presents for a follow-up related to depression with anxiety. Her energy level is good. She denies agorophobia. She sleeps well. She is currently taking a medication. The current medication regimen consists of Wellbutrin XL. She is having a bit more anxiety. The patient denies having medication side effects including nausea, headaches, anxiety, increased depression, anorgasmia or fatigue.    Medications      Current Outpatient Medications:     Ajovy 225 MG/1.5ML solution auto-injector, Every 30 (Thirty) Days., Disp: , Rfl:     Atogepant (Qulipta) 60 MG tablet, Take 1 tablet by mouth Daily., Disp: , Rfl:     benzonatate (Tessalon Perles) 100 MG capsule, Take 1 capsule by mouth 3 (Three) Times a Day As Needed for Cough., Disp: 30 capsule, Rfl: 1    Betahistine HCl powder, , Disp: , Rfl:     buPROPion XL (Wellbutrin XL) 150 MG 24 hr tablet, Take 1 tablet by mouth Daily., Disp: 30 tablet, Rfl: 2    diazePAM (VALIUM) 5 MG tablet, TAKE 1 TABLET BY MOUTH EVERY 8 HOURS AS NEEDED FOR ANXIETY/VERTIGO, Disp: , Rfl:     dimenhyDRINATE (DRAMAMINE) 50 MG tablet, Take 1 tablet by mouth 4 (Four) Times a Day As Needed for Movement Disorders., Disp: 30 tablet, Rfl: 0    estradiol (ESTRACE) 0.1 MG/GM vaginal cream, Insert 1 g into the vagina 2 (Two) Times a Week., Disp: 42.5 g, Rfl: 3    estradiol (Minivelle) 0.025 MG/24HR patch, Place 1 patch on the skin as directed by provider 2 (Two) Times a Week. Can fill generic, Disp: 8 patch, Rfl: 12    levothyroxine (SYNTHROID, LEVOTHROID) 50 MCG tablet, Take 1 tablet by mouth Every Morning Before Breakfast., Disp: 90 tablet, Rfl: 1    meclizine 25 MG chewable tablet chewable tablet, Chew 1 tablet 3 (Three) Times a Day As Needed., Disp: , Rfl:     ondansetron ODT (ZOFRAN ODT) 4 MG disintegrating tablet, Take 1 tablet by mouth Every 8 (Eight) Hours  As Needed for Nausea or Vomiting., Disp: 20 tablet, Rfl: 0    Progesterone (PROMETRIUM) 100 MG capsule, Take 1 capsule by mouth Daily., Disp: 90 capsule, Rfl: 3    promethazine (PHENERGAN) 12.5 MG tablet, Take 1 tablet by mouth Every 6 (Six) Hours As Needed for Nausea or Vomiting., Disp: 20 tablet, Rfl: 0     Allergies    No Known Allergies    Problem List    Patient Active Problem List   Diagnosis    Other specified hypothyroidism    Anxiety    Benign paroxysmal positional vertigo due to bilateral vestibular disorder    Rash in adult    Well woman exam       Medications, Allergies, Problems List and Past History were reviewed and updated.    Physical Examination    /60 (BP Location: Left arm, Patient Position: Sitting, Cuff Size: Adult)   Pulse 64   Temp 98 °F (36.7 °C) (Infrared)   Resp 18   Wt 86.6 kg (191 lb)   BMI 34.93 kg/m²       Psychiatric Examination: The patient appears appropriate, clean and tidy with a level of consciousness that is appropriate and alert. The facial expression is appropriate and she makes good eye contact. The patient is talkative and the speech volume is appropriate. The words are articulated clearly with a normal flow. There are no circumlocutions and the patient does not paraphrase.       The mood is appropriate. There are no abnormal thought processes and the thought content is normal. There are no delusions or hallucinations noted.    Impression and Assessment    Depression with Anxiety.    Plan    Depression with Anxiety Plan: The medications were adjusted as noted.    Diagnoses and all orders for this visit:    1. Depression with anxiety (Primary)  -     buPROPion XL (Wellbutrin XL) 300 MG 24 hr tablet; Take 1 tablet by mouth Daily.  Dispense: 30 tablet; Refill: 2  -     sertraline (ZOLOFT) 50 MG tablet; Take 0.5 tablets by mouth Daily.  Dispense: 15 tablet; Refill: 2            Return to Office    The patient was instructed to return for follow-up in 3 months. The  patient was instructed to return sooner if the condition changes, worsens, or does not resolve.

## 2025-07-09 ENCOUNTER — DOCUMENTATION (OUTPATIENT)
Dept: GENETICS | Facility: HOSPITAL | Age: 65
End: 2025-07-09
Payer: MEDICARE

## 2025-07-09 ENCOUNTER — TELEPHONE (OUTPATIENT)
Dept: GENETICS | Facility: HOSPITAL | Age: 65
End: 2025-07-09
Payer: MEDICARE

## 2025-07-09 NOTE — TELEPHONE ENCOUNTER
Spoke with patient and disclosed negative genetic results. Informed patient these results would be on [employer] and sent to their

## 2025-07-09 NOTE — PROGRESS NOTES
Billie Foster, a 65 year old female, was referred for genetic counseling due to a family history of colon and pancreatic cancer. She was accompanied to the appointment by her sister Roz. She was 10 years old at menarche and had her first child at 23. Her current cancer screening includes annual mammograms and colonoscopies every 5 years. She does not report a personal history of colon polyps. Ms. Foster is post-menopausal and has been taking HRT for about ten years. She retains her uterus and ovaries. She was interested in discussing her risk for a hereditary cancer syndrome. Ms. Foster was interested in pursuing a multigene panel, and therefore the Multi-Cancer panel was ordered through [facility] which analyzes 70 genes associated with an increased cancer risk.  Genetic testing was negative for pathogenic mutations in 70 genes included on the [facility] Multi-Cancer panel. These results were discussed with Ms. Foster by telephone on 7/9/2025.    FAMILY HISTORY:   Mother:                                     Skin cancer, 75 (squamous cell)  Sister:                                       Skin cancer, 65 (squamous cell)  Mat. Aunt:                                 Gallbladder cancer, 75   Mat. 1st Cousin:                       Lymphoma, 21   Mat. 1st Cousin:                       Colon cancer, late 30s   Mat. 1st Cousin:                       Throat cancer, 60s   Mat. Uncle:                               Eye cancer (unknown type), 40s   Mat. Uncle:                               Unknown type of cancer, 60s  Mat. Grandfather:                     Lung cancer, 60s   Pat. Uncle:                               Stomach cancer, 40s   Pat. Aunt:                                 Colon cancer, 60s   Pat. Aunt:                                  Possible ovarian cancer, 90s   Pat. 1st Cousin:                       Pancreatic cancer, 61   Pat. 1st Cousin x 2:                  Breast cancer dx. 30s-40s   Pat. 1st Cousin once removed:           Lymphoma, 20s     We do not have medical records confirming the diagnoses in Ms. Edwards's family.     RISK ASSESSMENT: Ms. Morgans  family history of colon and pancreatic cancer led to concern regarding a hereditary cancer syndrome.  She meets NCCN guidelines criteria for Riddle syndrome gene testing based on her family history of a second degree relative diagnosed with stomach cancer before age 50 and another second degree relative with colon cancer. The standard approach to genetic testing is through a multigene panel. If genetic testing is negative, Ms. Mejiass management should be guided by family history.      Because genetic testing was negative, Ms. Galicia management should be guided by a family history-based risk assessment.  [employer], version 8 is able to take into account personal factors and family history when calculating risk for breast cancer. Computer modeling estimates that Ms. Mejiass lifetime personal risk for developing breast cancer is 5.9% ([employer], v8), in comparison to the average 65-year-old female's risk of 5.8%. Per NCCN guidelines, a lifetime risk of 20% or greater is considered to be “high risk” where increased screening is warranted; Ms. Galicia risk does not fall into that category. This risk assessment is based on the family history information provided at the time of the appointment and could change in the future should new information be obtained.     GENETIC COUNSELING:  We reviewed the family history information in detail.  Cases of cancer follow three general patterns: sporadic, familial, and hereditary.  While most cancer is sporadic, some cases appear to occur in family clusters.  These cases are said to be familial and account for 10-20% of certain cancer cases.  Familial cases may be due to a combination of shared genes and environmental factors among family members.  In even fewer families (~10%), the cancer is said to be inherited, and the genes  responsible for the cancer are known.       Family histories typical of hereditary cancer syndromes usually include multiple first- and second-degree relatives diagnosed with cancer types that define a syndrome.  These cases tend to be diagnosed at younger-than-expected ages and can be bilateral or multifocal.  The cancer in these families follows an autosomal dominant inheritance pattern, which indicates the likely presence of a mutation in a cancer susceptibility gene.  Children and siblings of an individual believed to carry this mutation have a 50% chance of inheriting that mutation, thereby inheriting the increased risk to develop cancer.  These mutations can be passed down from the maternal or the paternal lineage.     The most common form of hereditary colorectal cancer is Riddle syndrome.  Riddle syndrome is caused by mutations in mismatch repair genes, (MLH1, MSH2, MSH6, PMS2, and EPCAM).  The lifetime risk for colon cancer for individuals with Riddle syndrome can be as high as 60% if there is no intervention (i.e. removal of polyps detected on colonoscopy).  Other risks include gastric, urinary tract, small bowel, biliary tract, pancreas, and brain.  Females with Riddle syndrome also have an elevated risk for endometrial and ovarian cancer.     The standard approach to genetic testing is via a multigene panel.  Genes included on these panels have varying degrees of risk associated, and management and screening guidelines vary based on the specific gene.  Hereditary cancer syndromes can demonstrate incomplete penetrance and variable expression within families. There are genes that are evaluated that have been more recently described, and there may be less data regarding the risks and therefore may not have established management guidelines at this time. We discussed the possibility of results that are unexpected based on family history. Based on Ms. Foster's family history and her desire to get more  information regarding her personal risks and risks for her family, she opted to pursue testing through a panel evaluating several other genes known to increase the risk for cancer.      GENETIC TESTING:  The risks, benefits and limitations of genetic testing and implications for clinical management following testing were reviewed. DNA test results can influence decisions regarding screening and prevention.  Genetic testing can have significant psychological implications for both individuals and families. Also discussed was the possibility of employment and insurance discrimination based on genetic test results and the federal and states laws that are in place to prevent this (JOE), as well as the limitations of these laws.       We discussed multigene panel testing, which would involve testing several genes associated with an increased cancer risk. The benefits and limitations of genetic testing were discussed and Ms. Foster decided to pursue testing of the genes via the panel. The implications of a positive or negative test result were discussed.  We also discussed the importance of testing on an affected relative.  In cases where an affected relative is not available for testing or not willing to pursue testing, it is appropriate to offer testing to an unaffected individual. We discussed the possibility that, in some cases, genetic test results may be ambiguous due to the identification of a genetic variant of uncertain significance (VUS). These variants may or may not be associated with an increased cancer risk. With multigene panel testing, it is not uncommon for a VUS to be identified.  If a VUS is identified, testing family members is not recommended and screening recommendations are made based on the family history.  The laboratories that perform genetic testing work to reclassify the VUS and send out an amended report if and when a VUS is reclassified.  The majority of variant findings are ultimately  reclassified to a negative result. Given her family history, a negative test result does not eliminate all cancer risk, although the risk would not be as high as it would with positive genetic testing.      TEST RESULTS:  Genetic testing was negative for pathogenic mutations by sequencing, rearrangement testing, and RNA analysis for the genes associated with Riddle syndrome and 65 additional genes on the Multi-Cancer panel.  The negative result greatly lowers the risk of a hereditary cancer syndrome for Ms. Foster.  It is possible that the family history is due to a hereditary cancer syndrome which Ms. Foster did not happen to inherit. This assessment is based on the information provided at the time of the consultation.    CANCER SCREENING: Based on computer modeling, Ms. Foster's lifetime risk for breast cancer would not be considered “high risk” (>20%).   Per NCCN guidelines, it is appropriate for her to follow general population screening guidelines for her breast cancer risk including annual clinical breast exam and annual mammogram.     PLAN:  Genetic counseling remains available to Ms. Foster and she can contact us at [phone] with any questions.           Lucille Mcintyre MS, [facility], [facility]   Licensed Certified Genetic Counselor       Cc:  Margarita Rivas MD

## 2025-07-25 PROBLEM — Z80.0 FAMILY HISTORY OF COLON CANCER: Status: ACTIVE | Noted: 2025-07-25

## 2025-08-01 ENCOUNTER — TELEPHONE (OUTPATIENT)
Dept: OBSTETRICS AND GYNECOLOGY | Facility: CLINIC | Age: 65
End: 2025-08-01
Payer: MEDICARE

## 2025-08-01 NOTE — TELEPHONE ENCOUNTER
Insurance needs a prior auth or they want to use the generic medication    They won't refill the medication.     estradiol (Minivelle) 0.025 MG/24HR patch     [facility] [phone]